# Patient Record
Sex: MALE | Race: WHITE | Employment: FULL TIME | URBAN - METROPOLITAN AREA
[De-identification: names, ages, dates, MRNs, and addresses within clinical notes are randomized per-mention and may not be internally consistent; named-entity substitution may affect disease eponyms.]

---

## 2019-05-21 ENCOUNTER — OFFICE VISIT (OUTPATIENT)
Dept: ORTHOPEDIC SURGERY | Age: 74
End: 2019-05-21
Payer: MEDICARE

## 2019-05-21 VITALS — WEIGHT: 165 LBS | RESPIRATION RATE: 16 BRPM | BODY MASS INDEX: 23.1 KG/M2 | HEIGHT: 71 IN

## 2019-05-21 DIAGNOSIS — M25.521 RIGHT ELBOW PAIN: ICD-10-CM

## 2019-05-21 DIAGNOSIS — M70.21 OLECRANON BURSITIS OF RIGHT ELBOW: ICD-10-CM

## 2019-05-21 PROCEDURE — 4040F PNEUMOC VAC/ADMIN/RCVD: CPT | Performed by: ORTHOPAEDIC SURGERY

## 2019-05-21 PROCEDURE — G8427 DOCREV CUR MEDS BY ELIG CLIN: HCPCS | Performed by: ORTHOPAEDIC SURGERY

## 2019-05-21 PROCEDURE — 4004F PT TOBACCO SCREEN RCVD TLK: CPT | Performed by: ORTHOPAEDIC SURGERY

## 2019-05-21 PROCEDURE — E0191 PROTECTOR HEEL OR ELBOW: HCPCS | Performed by: ORTHOPAEDIC SURGERY

## 2019-05-21 PROCEDURE — G8420 CALC BMI NORM PARAMETERS: HCPCS | Performed by: ORTHOPAEDIC SURGERY

## 2019-05-21 PROCEDURE — 99203 OFFICE O/P NEW LOW 30 MIN: CPT | Performed by: ORTHOPAEDIC SURGERY

## 2019-05-21 PROCEDURE — 1123F ACP DISCUSS/DSCN MKR DOCD: CPT | Performed by: ORTHOPAEDIC SURGERY

## 2019-05-21 RX ORDER — IBUPROFEN 600 MG/1
600 TABLET ORAL
Status: ON HOLD | COMMUNITY
End: 2019-07-16 | Stop reason: HOSPADM

## 2019-05-21 RX ORDER — CEPHALEXIN 500 MG/1
500 CAPSULE ORAL 4 TIMES DAILY
Qty: 28 CAPSULE | Refills: 0 | Status: SHIPPED | OUTPATIENT
Start: 2019-05-21 | End: 2019-05-28

## 2019-05-21 RX ORDER — ASPIRIN 81 MG/1
81 TABLET, CHEWABLE ORAL
Status: ON HOLD | COMMUNITY
End: 2019-07-16 | Stop reason: HOSPADM

## 2019-05-21 RX ORDER — AMLODIPINE BESYLATE 10 MG/1
TABLET ORAL
Refills: 2 | Status: ON HOLD | COMMUNITY
Start: 2019-05-03 | End: 2019-07-16 | Stop reason: HOSPADM

## 2019-05-21 RX ORDER — ATORVASTATIN CALCIUM 40 MG/1
20 TABLET, FILM COATED ORAL DAILY
COMMUNITY

## 2019-05-21 RX ORDER — LOSARTAN POTASSIUM 50 MG/1
TABLET ORAL
Refills: 1 | Status: ON HOLD | COMMUNITY
Start: 2019-05-04 | End: 2019-07-16 | Stop reason: HOSPADM

## 2019-05-21 NOTE — PROGRESS NOTES
Chief Complaint  Elbow Pain (NEW PATIENT RT ELBOW PAIN)      History of Present Illness:  Dipesh Reynoso is a 66 y.o. male. He presents today for a new hand surgery and elbow specialty evaluation regarding his right elbow. He reports a few weeks ago he started to notice an abrasion over the posterior elbow where he was struck by a piece of firewood when traveling previously in [place]. In this area he started to notice some slight discomfort and fullness and some slight discoloration. He did not have any drainage or fevers or chills and has not had dramatic pain. He has not had any loss of range of motion to the elbow. Medical History  Past Medical History:   Diagnosis Date    Hyperlipidemia     Hypertension      History reviewed. No pertinent surgical history.   Social History     Socioeconomic History    Marital status: Single     Spouse name: None    Number of children: None    Years of education: None    Highest education level: None   Occupational History    None   Social Needs    Financial resource strain: None    Food insecurity:     Worry: None     Inability: None    Transportation needs:     Medical: None     Non-medical: None   Tobacco Use    Smoking status: Current Every Day Smoker     Types: Pipe   Substance and Sexual Activity    Alcohol use: Yes     Comment: social    Drug use: No    Sexual activity: None   Lifestyle    Physical activity:     Days per week: None     Minutes per session: None    Stress: None   Relationships    Social connections:     Talks on phone: None     Gets together: None     Attends Gnosticism service: None     Active member of club or organization: None     Attends meetings of clubs or organizations: None     Relationship status: None    Intimate partner violence:     Fear of current or ex partner: None     Emotionally abused: None     Physically abused: None     Forced sexual activity: None   Other Topics Concern    None   Social History Narrative  None     Current Outpatient Medications   Medication Sig Dispense Refill    atorvastatin (LIPITOR) 40 MG tablet Take 40 mg by mouth daily      cephALEXin (KEFLEX) 500 MG capsule Take 1 capsule by mouth 4 times daily for 7 days 28 capsule 0    amLODIPine (NORVASC) 10 MG tablet TAKE 1 TABLET BY MOUTH DAILY. TAPER OFF METOPROLOL  2    ibuprofen (ADVIL;MOTRIN) 600 MG tablet Take 600 mg by mouth      aspirin 81 MG chewable tablet Take 81 mg by mouth      losartan (COZAAR) 50 MG tablet TAKE 1 TABLET BY MOUTH EVERY DAY  1     No current facility-administered medications for this visit. No Known Allergies    Review of Systems  Pertinent items are noted in HPI  Denies fever, chills, confusion, bowel/bladder active change. Review of systems reviewed from Patient History Form dated on 5/21/19 and available in the patient's chart under the Media tab. Vital Signs  Vitals:    05/21/19 1436   Resp: 16       General Exam:   Constitutional: Patient is adequately groomed with no evidence of malnutrition  Mental Status: The patient is oriented to time, place and person. The patient's mood and affect are appropriate. Lymphatic: The lymphatic examination bilaterally reveals all areas to be without enlargement or induration. Neurological: The patient has good coordination. There is no weakness or sensory deficit. Elbow Examination  Inspection:  The patient has multiple areas of skin discoloration with thin skin and appears to bruising easily. There is no area of fullness over the posterior right elbow in the region of the olecranon bursa but no active drainage. There is a subacute abrasion which appears to be crusting and healing over the posterior elbow. Palpation:  There is no warmth over the posterior elbow and there is no palpable defect over the triceps. There is a palpable superficial abrasion with some crusting but no sign of active infection.   There is soft tissue fullness consistent with olecranon bursitis    Range of Motion:  Full range of motion    Strength:  Normal including triceps    Special Tests:  Elbow stability remains intact    Skin: There are no additional worrisome rashes, ulcerations or lesions. Gait: normal    Circulation normal    Additional Comments:     Additional Examinations:  Left Upper Extremity: Examination of the left upper extremity does not show any tenderness, deformity or injury. Range of motion is unremarkable. There is no gross instability. There are no rashes, ulcerations or lesions. Strength and tone are normal.      Radiology:     X-rays obtained and reviewed in office:  Views 3  Location right elbow  Impression Three views of the right elbow reveal no sign of fracture, no loose body, and satisfactory articular congruity. Assessment:  Right elbow olecranon bursitis    Impression:  Encounter Diagnoses   Name Primary?  Right elbow pain     Olecranon bursitis of right elbow        Office Procedures:  Orders Placed This Encounter   Procedures    XR ELBOW RIGHT (MIN 3 VIEWS)     Standing Status:   Future     Number of Occurrences:   1     Standing Expiration Date:   5/21/2020   Meli Wilson and Emmanuelle Heel and Elbow Protector     Patient was prescribed a Bird and Emmanuelle Elbow Protector. The right elbow will require protection from this device to improve their function. The orthosis will assist in protecting the affected area, provide functional support and facilitate healing. The patient was educated and fit by a healthcare professional with expert knowledge and specialization in brace application while under the direct supervision of the treating physician. Verbal and written instructions for the use of and application of this item were provided. They were instructed to contact the office immediately should the brace result in increased pain, decreased sensation, increased swelling or worsening of the condition.        Treatment Plan:  I discussed with the patient that very likely his bursitis has emanated from a slight abrasion but that there is currently no convincing sign of worrisome active infection. Nevertheless, I would like to supply him both prescription for an antibiotic as well as a padded elbow sleeve to minimize the chance of any progression into an infectious process that would become more concerning. We talked about skin care, moisturizing the area of the dry skin and abrasion, and use of the elbow pad and prevention of unnecessary pressure over the elbow. I strongly suspect that this will resolve nicely with conservative care and time, but he does understand that maximum improvement in the fullness may take many weeks and even several months. I will be happy to see him back if he starts to have worsening symptoms or any concerns of active infection. Please note that this transcription was created using voice recognition software. Any errors are unintentional and may be due to voice recognition transcription.

## 2019-07-09 ENCOUNTER — HOSPITAL ENCOUNTER (INPATIENT)
Age: 74
LOS: 6 days | Discharge: ANOTHER HOSPITAL | DRG: 286 | End: 2019-07-16
Attending: EMERGENCY MEDICINE | Admitting: INTERNAL MEDICINE
Payer: MEDICARE

## 2019-07-09 ENCOUNTER — APPOINTMENT (OUTPATIENT)
Dept: GENERAL RADIOLOGY | Age: 74
DRG: 286 | End: 2019-07-09
Payer: MEDICARE

## 2019-07-09 ENCOUNTER — APPOINTMENT (OUTPATIENT)
Dept: CT IMAGING | Age: 74
DRG: 286 | End: 2019-07-09
Payer: MEDICARE

## 2019-07-09 DIAGNOSIS — J96.01 ACUTE RESPIRATORY FAILURE WITH HYPOXIA (HCC): ICD-10-CM

## 2019-07-09 DIAGNOSIS — I46.9 CARDIAC ARREST (HCC): Primary | ICD-10-CM

## 2019-07-09 DIAGNOSIS — R77.8 ELEVATED TROPONIN: ICD-10-CM

## 2019-07-09 DIAGNOSIS — G93.1 ANOXIC BRAIN DAMAGE (HCC): ICD-10-CM

## 2019-07-09 DIAGNOSIS — E87.20 LACTIC ACIDOSIS: ICD-10-CM

## 2019-07-09 DIAGNOSIS — I47.20 VENTRICULAR TACHYCARDIA: ICD-10-CM

## 2019-07-09 LAB
A/G RATIO: 1.6 (ref 1.1–2.2)
ABO/RH: NORMAL
ALBUMIN SERPL-MCNC: 4.4 G/DL (ref 3.4–5)
ALP BLD-CCNC: 92 U/L (ref 40–129)
ALT SERPL-CCNC: 80 U/L (ref 10–40)
ANION GAP SERPL CALCULATED.3IONS-SCNC: 19 MMOL/L (ref 3–16)
ANTIBODY SCREEN: NORMAL
AST SERPL-CCNC: 95 U/L (ref 15–37)
BASOPHILS ABSOLUTE: 0.1 K/UL (ref 0–0.2)
BASOPHILS RELATIVE PERCENT: 0.6 %
BILIRUB SERPL-MCNC: 0.9 MG/DL (ref 0–1)
BUN BLDV-MCNC: 30 MG/DL (ref 7–20)
CALCIUM SERPL-MCNC: 10 MG/DL (ref 8.3–10.6)
CHLORIDE BLD-SCNC: 101 MMOL/L (ref 99–110)
CO2: 22 MMOL/L (ref 21–32)
CREAT SERPL-MCNC: 0.8 MG/DL (ref 0.8–1.3)
EOSINOPHILS ABSOLUTE: 0.2 K/UL (ref 0–0.6)
EOSINOPHILS RELATIVE PERCENT: 1.4 %
GFR AFRICAN AMERICAN: >60
GFR NON-AFRICAN AMERICAN: >60
GLOBULIN: 2.8 G/DL
GLUCOSE BLD-MCNC: 147 MG/DL (ref 70–99)
GLUCOSE BLD-MCNC: 172 MG/DL (ref 70–99)
HCT VFR BLD CALC: 48.2 % (ref 40.5–52.5)
HEMOGLOBIN: 16.4 G/DL (ref 13.5–17.5)
INR BLD: 1.04 (ref 0.86–1.14)
LACTIC ACID: 6.2 MMOL/L (ref 0.4–2)
LYMPHOCYTES ABSOLUTE: 4.9 K/UL (ref 1–5.1)
LYMPHOCYTES RELATIVE PERCENT: 44.8 %
MAGNESIUM: 2 MG/DL (ref 1.8–2.4)
MCH RBC QN AUTO: 31.4 PG (ref 26–34)
MCHC RBC AUTO-ENTMCNC: 34.1 G/DL (ref 31–36)
MCV RBC AUTO: 92.2 FL (ref 80–100)
MONOCYTES ABSOLUTE: 0.6 K/UL (ref 0–1.3)
MONOCYTES RELATIVE PERCENT: 5.8 %
NEUTROPHILS ABSOLUTE: 5.2 K/UL (ref 1.7–7.7)
NEUTROPHILS RELATIVE PERCENT: 47.4 %
PDW BLD-RTO: 14.6 % (ref 12.4–15.4)
PERFORMED ON: ABNORMAL
PLATELET # BLD: 206 K/UL (ref 135–450)
PMV BLD AUTO: 8.5 FL (ref 5–10.5)
POTASSIUM SERPL-SCNC: 3.5 MMOL/L (ref 3.5–5.1)
PROTHROMBIN TIME: 11.8 SEC (ref 9.8–13)
RBC # BLD: 5.23 M/UL (ref 4.2–5.9)
SODIUM BLD-SCNC: 142 MMOL/L (ref 136–145)
TOTAL CK: 140 U/L (ref 39–308)
TOTAL PROTEIN: 7.2 G/DL (ref 6.4–8.2)
TROPONIN: 0.03 NG/ML
WBC # BLD: 11 K/UL (ref 4–11)

## 2019-07-09 PROCEDURE — 2500000003 HC RX 250 WO HCPCS: Performed by: EMERGENCY MEDICINE

## 2019-07-09 PROCEDURE — 83605 ASSAY OF LACTIC ACID: CPT

## 2019-07-09 PROCEDURE — 2580000003 HC RX 258: Performed by: EMERGENCY MEDICINE

## 2019-07-09 PROCEDURE — 2580000003 HC RX 258

## 2019-07-09 PROCEDURE — 70450 CT HEAD/BRAIN W/O DYE: CPT

## 2019-07-09 PROCEDURE — 99223 1ST HOSP IP/OBS HIGH 75: CPT | Performed by: INTERNAL MEDICINE

## 2019-07-09 PROCEDURE — 5A1945Z RESPIRATORY VENTILATION, 24-96 CONSECUTIVE HOURS: ICD-10-PCS | Performed by: EMERGENCY MEDICINE

## 2019-07-09 PROCEDURE — 71045 X-RAY EXAM CHEST 1 VIEW: CPT

## 2019-07-09 PROCEDURE — 96375 TX/PRO/DX INJ NEW DRUG ADDON: CPT

## 2019-07-09 PROCEDURE — 93005 ELECTROCARDIOGRAM TRACING: CPT | Performed by: EMERGENCY MEDICINE

## 2019-07-09 PROCEDURE — 85610 PROTHROMBIN TIME: CPT

## 2019-07-09 PROCEDURE — 82550 ASSAY OF CK (CPK): CPT

## 2019-07-09 PROCEDURE — 4500000026 HC ED CRITICAL CARE PROCEDURE

## 2019-07-09 PROCEDURE — 0BH17EZ INSERTION OF ENDOTRACHEAL AIRWAY INTO TRACHEA, VIA NATURAL OR ARTIFICIAL OPENING: ICD-10-PCS | Performed by: EMERGENCY MEDICINE

## 2019-07-09 PROCEDURE — 96376 TX/PRO/DX INJ SAME DRUG ADON: CPT

## 2019-07-09 PROCEDURE — 96374 THER/PROPH/DIAG INJ IV PUSH: CPT

## 2019-07-09 PROCEDURE — 86900 BLOOD TYPING SEROLOGIC ABO: CPT

## 2019-07-09 PROCEDURE — 51702 INSERT TEMP BLADDER CATH: CPT

## 2019-07-09 PROCEDURE — 86850 RBC ANTIBODY SCREEN: CPT

## 2019-07-09 PROCEDURE — 02HV33Z INSERTION OF INFUSION DEVICE INTO SUPERIOR VENA CAVA, PERCUTANEOUS APPROACH: ICD-10-PCS | Performed by: EMERGENCY MEDICINE

## 2019-07-09 PROCEDURE — 83735 ASSAY OF MAGNESIUM: CPT

## 2019-07-09 PROCEDURE — 86901 BLOOD TYPING SEROLOGIC RH(D): CPT

## 2019-07-09 PROCEDURE — 80053 COMPREHEN METABOLIC PANEL: CPT

## 2019-07-09 PROCEDURE — 6360000002 HC RX W HCPCS: Performed by: EMERGENCY MEDICINE

## 2019-07-09 PROCEDURE — 74018 RADEX ABDOMEN 1 VIEW: CPT

## 2019-07-09 PROCEDURE — 84484 ASSAY OF TROPONIN QUANT: CPT

## 2019-07-09 PROCEDURE — 85025 COMPLETE CBC W/AUTO DIFF WBC: CPT

## 2019-07-09 PROCEDURE — 99291 CRITICAL CARE FIRST HOUR: CPT

## 2019-07-09 RX ORDER — HEPARIN SODIUM 1000 [USP'U]/ML
4000 INJECTION, SOLUTION INTRAVENOUS; SUBCUTANEOUS PRN
Status: DISCONTINUED | OUTPATIENT
Start: 2019-07-09 | End: 2019-07-15

## 2019-07-09 RX ORDER — ETOMIDATE 2 MG/ML
INJECTION INTRAVENOUS DAILY PRN
Status: COMPLETED | OUTPATIENT
Start: 2019-07-09 | End: 2019-07-09

## 2019-07-09 RX ORDER — HEPARIN SODIUM 10000 [USP'U]/100ML
8.8 INJECTION, SOLUTION INTRAVENOUS CONTINUOUS
Status: DISCONTINUED | OUTPATIENT
Start: 2019-07-09 | End: 2019-07-10

## 2019-07-09 RX ORDER — AMIODARONE HYDROCHLORIDE 50 MG/ML
300 INJECTION, SOLUTION INTRAVENOUS ONCE
Status: COMPLETED | OUTPATIENT
Start: 2019-07-09 | End: 2019-07-09

## 2019-07-09 RX ORDER — SODIUM CHLORIDE 9 MG/ML
INJECTION, SOLUTION INTRAVENOUS
Status: COMPLETED
Start: 2019-07-09 | End: 2019-07-09

## 2019-07-09 RX ORDER — SODIUM CHLORIDE 9 MG/ML
INJECTION, SOLUTION INTRAVENOUS CONTINUOUS
Status: DISCONTINUED | OUTPATIENT
Start: 2019-07-09 | End: 2019-07-10

## 2019-07-09 RX ORDER — ROCURONIUM BROMIDE 10 MG/ML
INJECTION, SOLUTION INTRAVENOUS DAILY PRN
Status: COMPLETED | OUTPATIENT
Start: 2019-07-09 | End: 2019-07-09

## 2019-07-09 RX ORDER — MIDAZOLAM HYDROCHLORIDE 1 MG/ML
5 INJECTION INTRAMUSCULAR; INTRAVENOUS ONCE
Status: COMPLETED | OUTPATIENT
Start: 2019-07-09 | End: 2019-07-09

## 2019-07-09 RX ORDER — HEPARIN SODIUM 1000 [USP'U]/ML
2000 INJECTION, SOLUTION INTRAVENOUS; SUBCUTANEOUS PRN
Status: DISCONTINUED | OUTPATIENT
Start: 2019-07-09 | End: 2019-07-15

## 2019-07-09 RX ORDER — HEPARIN SODIUM 1000 [USP'U]/ML
4000 INJECTION, SOLUTION INTRAVENOUS; SUBCUTANEOUS ONCE
Status: COMPLETED | OUTPATIENT
Start: 2019-07-09 | End: 2019-07-09

## 2019-07-09 RX ADMIN — ROCURONIUM BROMIDE 7 MG: 10 SOLUTION INTRAVENOUS at 21:32

## 2019-07-09 RX ADMIN — MIDAZOLAM 1 MG/HR: 5 INJECTION INTRAMUSCULAR; INTRAVENOUS at 22:50

## 2019-07-09 RX ADMIN — HEPARIN SODIUM 4000 UNITS: 1000 INJECTION, SOLUTION INTRAVENOUS; SUBCUTANEOUS at 23:30

## 2019-07-09 RX ADMIN — AMIODARONE HYDROCHLORIDE 150 MG: 50 INJECTION, SOLUTION INTRAVENOUS at 22:27

## 2019-07-09 RX ADMIN — SODIUM CHLORIDE 2244 ML: 9 INJECTION, SOLUTION INTRAVENOUS at 22:00

## 2019-07-09 RX ADMIN — SODIUM CHLORIDE: 9 INJECTION, SOLUTION INTRAVENOUS at 21:30

## 2019-07-09 RX ADMIN — MIDAZOLAM HYDROCHLORIDE 5 MG: 2 INJECTION, SOLUTION INTRAMUSCULAR; INTRAVENOUS at 23:05

## 2019-07-09 RX ADMIN — AMIODARONE HYDROCHLORIDE 1 MG/MIN: 50 INJECTION, SOLUTION INTRAVENOUS at 22:44

## 2019-07-09 RX ADMIN — HEPARIN SODIUM AND DEXTROSE 8.8 ML/HR: 10000; 5 INJECTION INTRAVENOUS at 23:00

## 2019-07-09 RX ADMIN — MIDAZOLAM HYDROCHLORIDE 5 MG: 2 INJECTION, SOLUTION INTRAMUSCULAR; INTRAVENOUS at 21:34

## 2019-07-09 RX ADMIN — ETOMIDATE 20 MG: 2 INJECTION INTRAVENOUS at 21:31

## 2019-07-09 RX ADMIN — AMIODARONE HYDROCHLORIDE 300 MG: 50 INJECTION, SOLUTION INTRAVENOUS at 23:03

## 2019-07-09 ASSESSMENT — PULMONARY FUNCTION TESTS: PIF_VALUE: 14

## 2019-07-09 NOTE — LETTER
including skilled nursing facilities, home health agencies, inpatient rehabilitation facilities, and long term care hospitals. [facility] is working closely with the doctors and other health care providers that care for you during and following your hospital stay and for a period of time after you leave the hospital. By working together, the health care providers are trying to more efficiently provide well-managed, high quality, patient-centered care as you undergo treatment. Hospitals, doctors, and other health care providers that care for you following a hospital stay may receive an additional payment for providing better, more coordinated health care. Medicare will monitor your care to make sure you and others get high quality care. Your feedback is important     Medicare may also ask you to answer a survey about the services and care you received from [address] will be mailed to you. Your feedback will improve care for all people with Medicare who receive care from [facility]. Completion of this survey is optional.     Get more information     For more information about the Bundled Payments for [address] [place] [address], you can:    · Visit the CMS BPCI Advanced Website at http://bowles-brunson.net/ initiatives/bpci-advanced   · Call the [facility] BPCI-A team at [phone]. · Call 1-800-MEDICARE (2-214.447.2468). TTY users can call 4-832.372.3221     If you have concerns or complaints about your care, talk to your health care provider, or contact your Beneficiary and Family Centered Quality Improvement Organization [facility]). To get your [facility]'s phone number, visit Medicare.gov/contacts or call 1-800-MEDICARE. · To find a different hospital, visit www. hospitalcompare.[facility].gov or call 1-800[employer]x MEDICARE (1-487.215.4616). TTY users should call 4-214.831.8041.

## 2019-07-10 PROBLEM — R79.89 ELEVATED LFTS: Status: ACTIVE | Noted: 2019-07-10

## 2019-07-10 PROBLEM — R79.89 ELEVATED TROPONIN: Status: ACTIVE | Noted: 2019-07-10

## 2019-07-10 PROBLEM — J96.01 ACUTE RESPIRATORY FAILURE WITH HYPOXIA (HCC): Status: ACTIVE | Noted: 2019-07-10

## 2019-07-10 PROBLEM — E87.20 LACTIC ACID ACIDOSIS: Status: ACTIVE | Noted: 2019-07-10

## 2019-07-10 PROBLEM — I35.0 SEVERE AORTIC STENOSIS: Status: ACTIVE | Noted: 2019-07-10

## 2019-07-10 PROBLEM — I25.5 ISCHEMIC CARDIOMYOPATHY: Status: ACTIVE | Noted: 2019-07-10

## 2019-07-10 PROBLEM — R77.8 ELEVATED TROPONIN: Status: ACTIVE | Noted: 2019-07-10

## 2019-07-10 PROBLEM — I46.9 CARDIOPULMONARY ARREST WITH SUCCESSFUL RESUSCITATION (HCC): Status: ACTIVE | Noted: 2019-07-10

## 2019-07-10 PROBLEM — D72.829 LEUKOCYTOSIS: Status: ACTIVE | Noted: 2019-07-10

## 2019-07-10 LAB
A/G RATIO: 1.7 (ref 1.1–2.2)
ALBUMIN SERPL-MCNC: 3.9 G/DL (ref 3.4–5)
ALP BLD-CCNC: 79 U/L (ref 40–129)
ALT SERPL-CCNC: 89 U/L (ref 10–40)
AMORPHOUS: ABNORMAL /HPF
ANION GAP SERPL CALCULATED.3IONS-SCNC: 11 MMOL/L (ref 3–16)
ANION GAP SERPL CALCULATED.3IONS-SCNC: 12 MMOL/L (ref 3–16)
ANION GAP SERPL CALCULATED.3IONS-SCNC: 13 MMOL/L (ref 3–16)
APTT: 132 SEC (ref 26–36)
APTT: 31.6 SEC (ref 26–36)
APTT: 72.5 SEC (ref 26–36)
AST SERPL-CCNC: 151 U/L (ref 15–37)
BACTERIA: ABNORMAL /HPF
BASE EXCESS ARTERIAL: -4 (ref -3–3)
BASE EXCESS ARTERIAL: -4.2 MMOL/L (ref -3–3)
BASE EXCESS ARTERIAL: -5.3 MMOL/L (ref -3–3)
BASE EXCESS ARTERIAL: -6 (ref -3–3)
BILIRUB SERPL-MCNC: 0.9 MG/DL (ref 0–1)
BILIRUBIN URINE: NEGATIVE
BLOOD, URINE: ABNORMAL
BUN BLDV-MCNC: 30 MG/DL (ref 7–20)
BUN BLDV-MCNC: 31 MG/DL (ref 7–20)
BUN BLDV-MCNC: 32 MG/DL (ref 7–20)
CALCIUM IONIZED: 1.09 MMOL/L (ref 1.12–1.32)
CALCIUM IONIZED: 1.09 MMOL/L (ref 1.12–1.32)
CALCIUM IONIZED: 1.1 MMOL/L (ref 1.12–1.32)
CALCIUM SERPL-MCNC: 8.2 MG/DL (ref 8.3–10.6)
CALCIUM SERPL-MCNC: 8.2 MG/DL (ref 8.3–10.6)
CALCIUM SERPL-MCNC: 8.3 MG/DL (ref 8.3–10.6)
CARBOXYHEMOGLOBIN ARTERIAL: 0.3 % (ref 0–1.5)
CARBOXYHEMOGLOBIN ARTERIAL: 0.5 % (ref 0–1.5)
CHLORIDE BLD-SCNC: 103 MMOL/L (ref 99–110)
CHLORIDE BLD-SCNC: 108 MMOL/L (ref 99–110)
CHLORIDE BLD-SCNC: 108 MMOL/L (ref 99–110)
CHOLESTEROL, TOTAL: 150 MG/DL (ref 0–199)
CLARITY: ABNORMAL
CO2: 19 MMOL/L (ref 21–32)
CO2: 20 MMOL/L (ref 21–32)
CO2: 24 MMOL/L (ref 21–32)
COLOR: YELLOW
CREAT SERPL-MCNC: 0.7 MG/DL (ref 0.8–1.3)
GFR AFRICAN AMERICAN: >60
GFR NON-AFRICAN AMERICAN: >60
GLOBULIN: 2.3 G/DL
GLUCOSE BLD-MCNC: 130 MG/DL (ref 70–99)
GLUCOSE BLD-MCNC: 137 MG/DL (ref 70–99)
GLUCOSE BLD-MCNC: 164 MG/DL (ref 70–99)
GLUCOSE BLD-MCNC: 165 MG/DL (ref 70–99)
GLUCOSE BLD-MCNC: 210 MG/DL (ref 70–99)
GLUCOSE URINE: NEGATIVE MG/DL
HCO3 ARTERIAL: 19.8 MMOL/L (ref 21–29)
HCO3 ARTERIAL: 19.9 MMOL/L (ref 21–29)
HCO3 ARTERIAL: 21.7 MMOL/L (ref 21–29)
HCO3 ARTERIAL: 23 MMOL/L (ref 21–29)
HCT VFR BLD CALC: 43.2 % (ref 40.5–52.5)
HCT VFR BLD CALC: 43.4 % (ref 40.5–52.5)
HCT VFR BLD CALC: 43.9 % (ref 40.5–52.5)
HCT VFR BLD CALC: 44.6 % (ref 40.5–52.5)
HDLC SERPL-MCNC: 78 MG/DL (ref 40–60)
HEMOGLOBIN, ART, EXTENDED: 15.1 G/DL (ref 13.5–17.5)
HEMOGLOBIN, ART, EXTENDED: 15.3 G/DL (ref 13.5–17.5)
HEMOGLOBIN: 14.4 G/DL (ref 13.5–17.5)
HEMOGLOBIN: 14.8 G/DL (ref 13.5–17.5)
HEMOGLOBIN: 14.9 G/DL (ref 13.5–17.5)
HEMOGLOBIN: 15.2 G/DL (ref 13.5–17.5)
INR BLD: 1.18 (ref 0.86–1.14)
KETONES, URINE: NEGATIVE MG/DL
LACTIC ACID: 2.6 MMOL/L (ref 0.4–2)
LACTIC ACID: 2.8 MMOL/L (ref 0.4–2)
LACTIC ACID: 2.8 MMOL/L (ref 0.4–2)
LDL CHOLESTEROL CALCULATED: 62 MG/DL
LEUKOCYTE ESTERASE, URINE: NEGATIVE
LV EF: 28 %
LVEF MODALITY: NORMAL
MAGNESIUM: 1.6 MG/DL (ref 1.8–2.4)
MAGNESIUM: 2.1 MG/DL (ref 1.8–2.4)
MAGNESIUM: 2.2 MG/DL (ref 1.8–2.4)
MCH RBC QN AUTO: 30.6 PG (ref 26–34)
MCH RBC QN AUTO: 31.1 PG (ref 26–34)
MCH RBC QN AUTO: 31.2 PG (ref 26–34)
MCH RBC QN AUTO: 31.2 PG (ref 26–34)
MCHC RBC AUTO-ENTMCNC: 33.4 G/DL (ref 31–36)
MCHC RBC AUTO-ENTMCNC: 33.7 G/DL (ref 31–36)
MCHC RBC AUTO-ENTMCNC: 34 G/DL (ref 31–36)
MCHC RBC AUTO-ENTMCNC: 34.3 G/DL (ref 31–36)
MCV RBC AUTO: 90.7 FL (ref 80–100)
MCV RBC AUTO: 91.7 FL (ref 80–100)
MCV RBC AUTO: 91.7 FL (ref 80–100)
MCV RBC AUTO: 92.2 FL (ref 80–100)
METHEMOGLOBIN ARTERIAL: 0.5 %
METHEMOGLOBIN ARTERIAL: 0.6 %
MICROSCOPIC EXAMINATION: YES
NITRITE, URINE: NEGATIVE
O2 CONTENT ARTERIAL: 18 ML/DL
O2 CONTENT ARTERIAL: 21 ML/DL
O2 SAT, ARTERIAL: 86.6 %
O2 SAT, ARTERIAL: 87 % (ref 93–100)
O2 SAT, ARTERIAL: 90 % (ref 93–100)
O2 SAT, ARTERIAL: 97.4 %
O2 THERAPY: ABNORMAL
O2 THERAPY: ABNORMAL
PCO2 ARTERIAL: 36 MM HG (ref 35–45)
PCO2 ARTERIAL: 37.7 MMHG (ref 35–45)
PCO2 ARTERIAL: 38 MM HG (ref 35–45)
PCO2 ARTERIAL: 50.2 MMHG (ref 35–45)
PDW BLD-RTO: 14.2 % (ref 12.4–15.4)
PDW BLD-RTO: 14.4 % (ref 12.4–15.4)
PDW BLD-RTO: 14.6 % (ref 12.4–15.4)
PDW BLD-RTO: 14.7 % (ref 12.4–15.4)
PERFORMED ON: ABNORMAL
PH ARTERIAL: 7.28 (ref 7.35–7.45)
PH ARTERIAL: 7.34 (ref 7.35–7.45)
PH ARTERIAL: 7.35 (ref 7.35–7.45)
PH ARTERIAL: 7.37 (ref 7.35–7.45)
PH UA: 5 (ref 5–8)
PH VENOUS: 7.24 (ref 7.35–7.45)
PH VENOUS: 7.34 (ref 7.35–7.45)
PH VENOUS: 7.37 (ref 7.35–7.45)
PHOSPHORUS: 1.1 MG/DL (ref 2.5–4.9)
PHOSPHORUS: 2.7 MG/DL (ref 2.5–4.9)
PHOSPHORUS: 3.6 MG/DL (ref 2.5–4.9)
PLATELET # BLD: 166 K/UL (ref 135–450)
PLATELET # BLD: 166 K/UL (ref 135–450)
PLATELET # BLD: 188 K/UL (ref 135–450)
PLATELET # BLD: 201 K/UL (ref 135–450)
PMV BLD AUTO: 8.3 FL (ref 5–10.5)
PMV BLD AUTO: 8.5 FL (ref 5–10.5)
PMV BLD AUTO: 8.6 FL (ref 5–10.5)
PMV BLD AUTO: 8.7 FL (ref 5–10.5)
PO2 ARTERIAL: 111.6 MMHG (ref 75–108)
PO2 ARTERIAL: 50.4 MMHG (ref 75–108)
PO2 ARTERIAL: 54.7 MM HG (ref 75–108)
PO2 ARTERIAL: 61.8 MM HG (ref 75–108)
POC ACT LR: 199 SEC
POC ACT LR: 377 SEC
POC SAMPLE TYPE: ABNORMAL
POC SAMPLE TYPE: ABNORMAL
POTASSIUM REFLEX MAGNESIUM: 2.9 MMOL/L (ref 3.5–5.1)
POTASSIUM SERPL-SCNC: 2.9 MMOL/L (ref 3.5–5.1)
POTASSIUM SERPL-SCNC: 3.3 MMOL/L (ref 3.5–5.1)
POTASSIUM SERPL-SCNC: 3.4 MMOL/L (ref 3.5–5.1)
PROTEIN UA: NEGATIVE MG/DL
PROTHROMBIN TIME: 13.5 SEC (ref 9.8–13)
RBC # BLD: 4.71 M/UL (ref 4.2–5.9)
RBC # BLD: 4.76 M/UL (ref 4.2–5.9)
RBC # BLD: 4.79 M/UL (ref 4.2–5.9)
RBC # BLD: 4.86 M/UL (ref 4.2–5.9)
RBC UA: >100 /HPF (ref 0–2)
SODIUM BLD-SCNC: 138 MMOL/L (ref 136–145)
SODIUM BLD-SCNC: 140 MMOL/L (ref 136–145)
SODIUM BLD-SCNC: 140 MMOL/L (ref 136–145)
SPECIFIC GRAVITY UA: 1.01 (ref 1–1.03)
TCO2 ARTERIAL: 21 MMOL/L
TCO2 ARTERIAL: 21 MMOL/L
TCO2 ARTERIAL: 23 MMOL/L
TCO2 ARTERIAL: 24.5 MMOL/L
TOTAL PROTEIN: 6.2 G/DL (ref 6.4–8.2)
TRIGL SERPL-MCNC: 50 MG/DL (ref 0–150)
TROPONIN: 2.09 NG/ML
URINE TYPE: ABNORMAL
UROBILINOGEN, URINE: 0.2 E.U./DL
VLDLC SERPL CALC-MCNC: 10 MG/DL
WBC # BLD: 13.1 K/UL (ref 4–11)
WBC # BLD: 15 K/UL (ref 4–11)
WBC # BLD: 15.9 K/UL (ref 4–11)
WBC # BLD: 18 K/UL (ref 4–11)
WBC UA: ABNORMAL /HPF (ref 0–5)

## 2019-07-10 PROCEDURE — 36556 INSERT NON-TUNNEL CV CATH: CPT

## 2019-07-10 PROCEDURE — B2111ZZ FLUOROSCOPY OF MULTIPLE CORONARY ARTERIES USING LOW OSMOLAR CONTRAST: ICD-10-PCS | Performed by: INTERNAL MEDICINE

## 2019-07-10 PROCEDURE — 81001 URINALYSIS AUTO W/SCOPE: CPT

## 2019-07-10 PROCEDURE — 2580000003 HC RX 258: Performed by: EMERGENCY MEDICINE

## 2019-07-10 PROCEDURE — 2500000003 HC RX 250 WO HCPCS: Performed by: NURSE PRACTITIONER

## 2019-07-10 PROCEDURE — 82947 ASSAY GLUCOSE BLOOD QUANT: CPT

## 2019-07-10 PROCEDURE — 83605 ASSAY OF LACTIC ACID: CPT

## 2019-07-10 PROCEDURE — 4A023N7 MEASUREMENT OF CARDIAC SAMPLING AND PRESSURE, LEFT HEART, PERCUTANEOUS APPROACH: ICD-10-PCS | Performed by: INTERNAL MEDICINE

## 2019-07-10 PROCEDURE — 2580000003 HC RX 258: Performed by: INTERNAL MEDICINE

## 2019-07-10 PROCEDURE — 6360000004 HC RX CONTRAST MEDICATION: Performed by: INTERNAL MEDICINE

## 2019-07-10 PROCEDURE — 80053 COMPREHEN METABOLIC PANEL: CPT

## 2019-07-10 PROCEDURE — 85347 COAGULATION TIME ACTIVATED: CPT

## 2019-07-10 PROCEDURE — 85610 PROTHROMBIN TIME: CPT

## 2019-07-10 PROCEDURE — 6360000002 HC RX W HCPCS

## 2019-07-10 PROCEDURE — 2500000003 HC RX 250 WO HCPCS

## 2019-07-10 PROCEDURE — 6360000004 HC RX CONTRAST MEDICATION

## 2019-07-10 PROCEDURE — C1887 CATHETER, GUIDING: HCPCS

## 2019-07-10 PROCEDURE — 33210 INSERT ELECTRD/PM CATH SNGL: CPT

## 2019-07-10 PROCEDURE — C9113 INJ PANTOPRAZOLE SODIUM, VIA: HCPCS | Performed by: INTERNAL MEDICINE

## 2019-07-10 PROCEDURE — 99291 CRITICAL CARE FIRST HOUR: CPT | Performed by: INTERNAL MEDICINE

## 2019-07-10 PROCEDURE — 84484 ASSAY OF TROPONIN QUANT: CPT

## 2019-07-10 PROCEDURE — 2100000000 HC CCU R&B

## 2019-07-10 PROCEDURE — 6360000002 HC RX W HCPCS: Performed by: INTERNAL MEDICINE

## 2019-07-10 PROCEDURE — 36620 INSERTION CATHETER ARTERY: CPT

## 2019-07-10 PROCEDURE — 6370000000 HC RX 637 (ALT 250 FOR IP): Performed by: INTERNAL MEDICINE

## 2019-07-10 PROCEDURE — 83735 ASSAY OF MAGNESIUM: CPT

## 2019-07-10 PROCEDURE — 2720000010 HC SURG SUPPLY STERILE

## 2019-07-10 PROCEDURE — 85730 THROMBOPLASTIN TIME PARTIAL: CPT

## 2019-07-10 PROCEDURE — C1894 INTRO/SHEATH, NON-LASER: HCPCS

## 2019-07-10 PROCEDURE — C8929 TTE W OR WO FOL WCON,DOPPLER: HCPCS

## 2019-07-10 PROCEDURE — 99153 MOD SED SAME PHYS/QHP EA: CPT

## 2019-07-10 PROCEDURE — 37799 UNLISTED PX VASCULAR SURGERY: CPT

## 2019-07-10 PROCEDURE — 85027 COMPLETE CBC AUTOMATED: CPT

## 2019-07-10 PROCEDURE — 2709999900 HC NON-CHARGEABLE SUPPLY

## 2019-07-10 PROCEDURE — 5A1223Z PERFORMANCE OF CARDIAC PACING, CONTINUOUS: ICD-10-PCS | Performed by: INTERNAL MEDICINE

## 2019-07-10 PROCEDURE — 99152 MOD SED SAME PHYS/QHP 5/>YRS: CPT

## 2019-07-10 PROCEDURE — 80061 LIPID PANEL: CPT

## 2019-07-10 PROCEDURE — 2580000003 HC RX 258

## 2019-07-10 PROCEDURE — 2700000000 HC OXYGEN THERAPY PER DAY

## 2019-07-10 PROCEDURE — 2500000003 HC RX 250 WO HCPCS: Performed by: INTERNAL MEDICINE

## 2019-07-10 PROCEDURE — 99152 MOD SED SAME PHYS/QHP 5/>YRS: CPT | Performed by: INTERNAL MEDICINE

## 2019-07-10 PROCEDURE — 82330 ASSAY OF CALCIUM: CPT

## 2019-07-10 PROCEDURE — 31500 INSERT EMERGENCY AIRWAY: CPT

## 2019-07-10 PROCEDURE — 82803 BLOOD GASES ANY COMBINATION: CPT

## 2019-07-10 PROCEDURE — 94761 N-INVAS EAR/PLS OXIMETRY MLT: CPT

## 2019-07-10 PROCEDURE — C1769 GUIDE WIRE: HCPCS

## 2019-07-10 PROCEDURE — 84100 ASSAY OF PHOSPHORUS: CPT

## 2019-07-10 PROCEDURE — 94750 HC PULMONARY COMPLIANCE STUDY: CPT

## 2019-07-10 PROCEDURE — 94002 VENT MGMT INPAT INIT DAY: CPT

## 2019-07-10 PROCEDURE — 2580000003 HC RX 258: Performed by: NURSE PRACTITIONER

## 2019-07-10 PROCEDURE — 93454 CORONARY ARTERY ANGIO S&I: CPT | Performed by: INTERNAL MEDICINE

## 2019-07-10 PROCEDURE — 92953 TEMPORARY EXTERNAL PACING: CPT

## 2019-07-10 PROCEDURE — 99292 CRITICAL CARE ADDL 30 MIN: CPT | Performed by: INTERNAL MEDICINE

## 2019-07-10 PROCEDURE — 93454 CORONARY ARTERY ANGIO S&I: CPT

## 2019-07-10 PROCEDURE — 94770 HC ETCO2 MONITOR DAILY: CPT

## 2019-07-10 PROCEDURE — 6360000002 HC RX W HCPCS: Performed by: EMERGENCY MEDICINE

## 2019-07-10 RX ORDER — POTASSIUM CHLORIDE 29.8 MG/ML
20 INJECTION INTRAVENOUS PRN
Status: DISCONTINUED | OUTPATIENT
Start: 2019-07-10 | End: 2019-07-16 | Stop reason: HOSPADM

## 2019-07-10 RX ORDER — SODIUM CHLORIDE 0.9 % (FLUSH) 0.9 %
10 SYRINGE (ML) INJECTION EVERY 12 HOURS SCHEDULED
Status: DISCONTINUED | OUTPATIENT
Start: 2019-07-10 | End: 2019-07-10 | Stop reason: SDUPTHER

## 2019-07-10 RX ORDER — ATORVASTATIN CALCIUM 40 MG/1
40 TABLET, FILM COATED ORAL DAILY
Status: DISCONTINUED | OUTPATIENT
Start: 2019-07-10 | End: 2019-07-16 | Stop reason: HOSPADM

## 2019-07-10 RX ORDER — SODIUM CHLORIDE 0.9 % (FLUSH) 0.9 %
10 SYRINGE (ML) INJECTION PRN
Status: DISCONTINUED | OUTPATIENT
Start: 2019-07-10 | End: 2019-07-16 | Stop reason: HOSPADM

## 2019-07-10 RX ORDER — SODIUM CHLORIDE 9 MG/ML
INJECTION, SOLUTION INTRAVENOUS CONTINUOUS
Status: DISCONTINUED | OUTPATIENT
Start: 2019-07-10 | End: 2019-07-10

## 2019-07-10 RX ORDER — SODIUM CHLORIDE 9 MG/ML
INJECTION, SOLUTION INTRAVENOUS
Status: DISPENSED
Start: 2019-07-10 | End: 2019-07-11

## 2019-07-10 RX ORDER — ASPIRIN 81 MG/1
81 TABLET, CHEWABLE ORAL DAILY
Status: DISCONTINUED | OUTPATIENT
Start: 2019-07-10 | End: 2019-07-15

## 2019-07-10 RX ORDER — MAGNESIUM SULFATE 1 G/100ML
1 INJECTION INTRAVENOUS PRN
Status: DISCONTINUED | OUTPATIENT
Start: 2019-07-10 | End: 2019-07-16 | Stop reason: HOSPADM

## 2019-07-10 RX ORDER — ACETAMINOPHEN 325 MG/1
650 TABLET ORAL EVERY 4 HOURS PRN
Status: DISCONTINUED | OUTPATIENT
Start: 2019-07-10 | End: 2019-07-16 | Stop reason: HOSPADM

## 2019-07-10 RX ORDER — SODIUM CHLORIDE 0.9 % (FLUSH) 0.9 %
10 SYRINGE (ML) INJECTION PRN
Status: DISCONTINUED | OUTPATIENT
Start: 2019-07-10 | End: 2019-07-10 | Stop reason: SDUPTHER

## 2019-07-10 RX ORDER — PANTOPRAZOLE SODIUM 40 MG/10ML
40 INJECTION, POWDER, LYOPHILIZED, FOR SOLUTION INTRAVENOUS DAILY
Status: DISCONTINUED | OUTPATIENT
Start: 2019-07-10 | End: 2019-07-16 | Stop reason: HOSPADM

## 2019-07-10 RX ORDER — CHLORHEXIDINE GLUCONATE 0.12 MG/ML
15 RINSE ORAL 2 TIMES DAILY
Status: DISCONTINUED | OUTPATIENT
Start: 2019-07-10 | End: 2019-07-16 | Stop reason: HOSPADM

## 2019-07-10 RX ORDER — DOPAMINE HYDROCHLORIDE 160 MG/100ML
5 INJECTION, SOLUTION INTRAVENOUS CONTINUOUS
Status: DISCONTINUED | OUTPATIENT
Start: 2019-07-10 | End: 2019-07-12

## 2019-07-10 RX ORDER — PROPOFOL 10 MG/ML
10 INJECTION, EMULSION INTRAVENOUS
Status: DISCONTINUED | OUTPATIENT
Start: 2019-07-10 | End: 2019-07-10

## 2019-07-10 RX ORDER — SODIUM CHLORIDE 9 MG/ML
INJECTION, SOLUTION INTRAVENOUS
Status: DISPENSED
Start: 2019-07-10 | End: 2019-07-10

## 2019-07-10 RX ORDER — HEPARIN SODIUM 10000 [USP'U]/100ML
4.4 INJECTION, SOLUTION INTRAVENOUS CONTINUOUS
Status: DISCONTINUED | OUTPATIENT
Start: 2019-07-10 | End: 2019-07-10

## 2019-07-10 RX ORDER — FUROSEMIDE 10 MG/ML
40 INJECTION INTRAMUSCULAR; INTRAVENOUS ONCE
Status: COMPLETED | OUTPATIENT
Start: 2019-07-10 | End: 2019-07-10

## 2019-07-10 RX ORDER — CARBOXYMETHYLCELLULOSE SODIUM 10 MG/ML
1 GEL OPHTHALMIC 3 TIMES DAILY
Status: DISCONTINUED | OUTPATIENT
Start: 2019-07-10 | End: 2019-07-14

## 2019-07-10 RX ORDER — DOPAMINE HYDROCHLORIDE 160 MG/100ML
5 INJECTION, SOLUTION INTRAVENOUS CONTINUOUS
Status: DISCONTINUED | OUTPATIENT
Start: 2019-07-10 | End: 2019-07-10 | Stop reason: SDUPTHER

## 2019-07-10 RX ORDER — FENTANYL CITRATE 50 UG/ML
25 INJECTION, SOLUTION INTRAMUSCULAR; INTRAVENOUS
Status: DISCONTINUED | OUTPATIENT
Start: 2019-07-10 | End: 2019-07-15

## 2019-07-10 RX ORDER — HEPARIN SODIUM 10000 [USP'U]/100ML
14.3 INJECTION, SOLUTION INTRAVENOUS CONTINUOUS
Status: DISCONTINUED | OUTPATIENT
Start: 2019-07-10 | End: 2019-07-15

## 2019-07-10 RX ORDER — ACETAMINOPHEN 650 MG/1
650 SUPPOSITORY RECTAL EVERY 4 HOURS PRN
Status: DISCONTINUED | OUTPATIENT
Start: 2019-07-10 | End: 2019-07-16 | Stop reason: HOSPADM

## 2019-07-10 RX ORDER — POTASSIUM CHLORIDE 7.45 MG/ML
10 INJECTION INTRAVENOUS PRN
Status: DISCONTINUED | OUTPATIENT
Start: 2019-07-10 | End: 2019-07-10

## 2019-07-10 RX ORDER — ONDANSETRON 2 MG/ML
4 INJECTION INTRAMUSCULAR; INTRAVENOUS EVERY 6 HOURS PRN
Status: DISCONTINUED | OUTPATIENT
Start: 2019-07-10 | End: 2019-07-10 | Stop reason: SDUPTHER

## 2019-07-10 RX ORDER — SODIUM CHLORIDE 0.9 % (FLUSH) 0.9 %
10 SYRINGE (ML) INJECTION EVERY 12 HOURS SCHEDULED
Status: DISCONTINUED | OUTPATIENT
Start: 2019-07-10 | End: 2019-07-16 | Stop reason: HOSPADM

## 2019-07-10 RX ORDER — ONDANSETRON 2 MG/ML
4 INJECTION INTRAMUSCULAR; INTRAVENOUS EVERY 6 HOURS PRN
Status: DISCONTINUED | OUTPATIENT
Start: 2019-07-10 | End: 2019-07-16 | Stop reason: HOSPADM

## 2019-07-10 RX ADMIN — SODIUM CHLORIDE: 9 INJECTION, SOLUTION INTRAVENOUS at 15:09

## 2019-07-10 RX ADMIN — FENTANYL CITRATE 25 MCG/HR: 50 INJECTION INTRAVENOUS at 02:58

## 2019-07-10 RX ADMIN — FENTANYL CITRATE 125 MCG/HR: 50 INJECTION INTRAVENOUS at 18:11

## 2019-07-10 RX ADMIN — PERFLUTREN 2.42 MG: 6.52 INJECTION, SUSPENSION INTRAVENOUS at 08:48

## 2019-07-10 RX ADMIN — SODIUM PHOSPHATE, MONOBASIC, MONOHYDRATE 24.48 MMOL: 276; 142 INJECTION, SOLUTION INTRAVENOUS at 16:41

## 2019-07-10 RX ADMIN — NOREPINEPHRINE BITARTRATE 25 MCG/MIN: 1 INJECTION INTRAVENOUS at 23:45

## 2019-07-10 RX ADMIN — POTASSIUM CHLORIDE 20 MEQ: 29.8 INJECTION, SOLUTION INTRAVENOUS at 10:31

## 2019-07-10 RX ADMIN — HEPARIN SODIUM AND DEXTROSE 7.6 ML/HR: 10000; 5 INJECTION INTRAVENOUS at 15:32

## 2019-07-10 RX ADMIN — AMIODARONE HYDROCHLORIDE 0.5 MG/MIN: 50 INJECTION, SOLUTION INTRAVENOUS at 07:13

## 2019-07-10 RX ADMIN — MUPIROCIN: 20 OINTMENT TOPICAL at 09:15

## 2019-07-10 RX ADMIN — CARBOXYMETHYLCELLULOSE SODIUM 1 DROP: 10 GEL OPHTHALMIC at 15:00

## 2019-07-10 RX ADMIN — DOPAMINE HYDROCHLORIDE 15 MCG/KG/MIN: 160 INJECTION, SOLUTION INTRAVENOUS at 06:22

## 2019-07-10 RX ADMIN — Medication 10 ML: at 08:37

## 2019-07-10 RX ADMIN — FUROSEMIDE 40 MG: 10 INJECTION, SOLUTION INTRAMUSCULAR; INTRAVENOUS at 17:02

## 2019-07-10 RX ADMIN — Medication 15 ML: at 20:06

## 2019-07-10 RX ADMIN — NOREPINEPHRINE BITARTRATE 6 MCG/MIN: 1 INJECTION INTRAVENOUS at 13:02

## 2019-07-10 RX ADMIN — MIDAZOLAM 9 MG/HR: 5 INJECTION INTRAMUSCULAR; INTRAVENOUS at 23:08

## 2019-07-10 RX ADMIN — MUPIROCIN: 20 OINTMENT TOPICAL at 20:06

## 2019-07-10 RX ADMIN — MIDAZOLAM 9 MG/HR: 5 INJECTION INTRAMUSCULAR; INTRAVENOUS at 11:09

## 2019-07-10 RX ADMIN — POTASSIUM CHLORIDE 20 MEQ: 29.8 INJECTION, SOLUTION INTRAVENOUS at 06:18

## 2019-07-10 RX ADMIN — POTASSIUM CHLORIDE 20 MEQ: 29.8 INJECTION, SOLUTION INTRAVENOUS at 09:19

## 2019-07-10 RX ADMIN — AMIODARONE HYDROCHLORIDE 0.5 MG/MIN: 50 INJECTION, SOLUTION INTRAVENOUS at 23:45

## 2019-07-10 RX ADMIN — PANTOPRAZOLE SODIUM 40 MG: 40 INJECTION, POWDER, FOR SOLUTION INTRAVENOUS at 09:15

## 2019-07-10 RX ADMIN — SODIUM CHLORIDE 2 MCG/KG/MIN: 9 INJECTION, SOLUTION INTRAVENOUS at 03:40

## 2019-07-10 RX ADMIN — AMIODARONE HYDROCHLORIDE 0.5 MG/MIN: 50 INJECTION, SOLUTION INTRAVENOUS at 08:23

## 2019-07-10 RX ADMIN — POTASSIUM CHLORIDE 20 MEQ: 29.8 INJECTION, SOLUTION INTRAVENOUS at 17:26

## 2019-07-10 RX ADMIN — FENTANYL CITRATE 125 MCG/HR: 50 INJECTION INTRAVENOUS at 09:44

## 2019-07-10 RX ADMIN — MAGNESIUM SULFATE HEPTAHYDRATE 1 G: 1 INJECTION, SOLUTION INTRAVENOUS at 04:29

## 2019-07-10 RX ADMIN — CARBOXYMETHYLCELLULOSE SODIUM 1 DROP: 10 GEL OPHTHALMIC at 09:15

## 2019-07-10 RX ADMIN — Medication 15 ML: at 05:38

## 2019-07-10 RX ADMIN — MAGNESIUM SULFATE HEPTAHYDRATE 1 G: 1 INJECTION, SOLUTION INTRAVENOUS at 05:36

## 2019-07-10 RX ADMIN — POTASSIUM CHLORIDE 20 MEQ: 29.8 INJECTION, SOLUTION INTRAVENOUS at 16:06

## 2019-07-10 RX ADMIN — CARBOXYMETHYLCELLULOSE SODIUM 1 DROP: 10 GEL OPHTHALMIC at 20:06

## 2019-07-10 ASSESSMENT — PULMONARY FUNCTION TESTS
PIF_VALUE: 20
PIF_VALUE: 21
PIF_VALUE: 20
PIF_VALUE: 21
PIF_VALUE: 17
PIF_VALUE: 18
PIF_VALUE: 19
PIF_VALUE: 15
PIF_VALUE: 18
PIF_VALUE: 20
PIF_VALUE: 21
PIF_VALUE: 21
PIF_VALUE: 20
PIF_VALUE: 20
PIF_VALUE: 21
PIF_VALUE: 18
PIF_VALUE: 21
PIF_VALUE: 20
PIF_VALUE: 19
PIF_VALUE: 19

## 2019-07-10 NOTE — PROGRESS NOTES
Orders received to pull both arteral and venious left groin sheath from Waleska Oscar. ACT drawn and resulted 142. Sheath site without hematoma or oozing. Arterial sheath removed per policy without difficulty. Integrity of sheath inspected upon removal and no abnormalities noted. Manual pressure held X 20 minutes. Dry, sterile tegaderm applied. Pressure dressing applied. Patient tolerated well. Vital signs, groin checks, and pedal pulses will be completed per protocol (every 15 minutes X 4, every 30 minutes X 2, and every hour X 2 per protocol). Sheath removed by  Vasile Luciano RN.

## 2019-07-10 NOTE — PRE SEDATION
sodium chloride infusion   Intravenous Continuous Kulwinder Dotter V,  mL/hr at 07/09/19 2130      heparin (porcine) injection 4,000 Units  4,000 Units Intravenous Once Kulwinder Dotter V, DO        heparin (porcine) injection 4,000 Units  4,000 Units Intravenous PRN Kulwinder Dotter V, DO        heparin (porcine) injection 2,000 Units  2,000 Units Intravenous PRN Zenobia Mash, DO        heparin 25,000 units in dextrose 5% 250 mL infusion  8.8 mL/hr Intravenous Continuous Kulwinder Dotter V, DO        fentaNYL (SUBLIMAZE) 1,000 mcg in sodium chloride 0.9 % 100 mL infusion  25 mcg/hr Intravenous Continuous Zenobia Mash, DO         Current Outpatient Medications   Medication Sig Dispense Refill    amLODIPine (NORVASC) 10 MG tablet TAKE 1 TABLET BY MOUTH DAILY. TAPER OFF METOPROLOL  2    ibuprofen (ADVIL;MOTRIN) 600 MG tablet Take 600 mg by mouth      aspirin 81 MG chewable tablet Take 81 mg by mouth      losartan (COZAAR) 50 MG tablet TAKE 1 TABLET BY MOUTH EVERY DAY  1    atorvastatin (LIPITOR) 40 MG tablet Take 40 mg by mouth daily         Pre-Sedation Documentation and Exam:  I have assessed the patient and agree with the H&P present on the chart. Prior History of Anesthesia Complications:   none    Modified Mallampati:  III (soft palate, base of uvula visible)    ASA Classification:  E Status - the procedure is performed on an Emergency basis    Frederick Scale: Activity:  0 - Able to move 0 extremities voluntarily on command  Respiration:  0 - Apneic  Circulation:  1 - BP+/- 20-50mmHg of normal  Consciousness:  0 - Not responsive  Oxygen Saturation (color):  1 - Needs oxygen to maintain oxygen saturation >90%    Sedation/Anesthesia Plan:  Guard the patient's safety and welfare. Minimize physical discomfort and pain. Minimize negative psychological responses to treatment by providing sedation and analgesia and maximize the potential amnesia.   Patient to meet pre-procedure discharge

## 2019-07-10 NOTE — CONSULTS
Reason for Exam: ett Acuity: Acute Type of Exam: Initial FINDINGS: Esophagogastric tube terminates in antegrade fashion over the left upper quadrant. Clear lung bases. Nonobstructive bowel gas pattern. Left nephrolithiasis. NG tube terminates in the stomach. Ct Head Wo Contrast    Result Date: 7/9/2019  EXAMINATION: CT OF THE HEAD WITHOUT CONTRAST  7/9/2019 7:05 pm TECHNIQUE: CT of the head was performed without the administration of intravenous contrast. Dose modulation, iterative reconstruction, and/or weight based adjustment of the mA/kV was utilized to reduce the radiation dose to as low as reasonably achievable. COMPARISON: None. HISTORY: ORDERING SYSTEM PROVIDED HISTORY: poss arrest TECHNOLOGIST PROVIDED HISTORY: Has a \"code stroke\" or \"stroke alert\" been called? ->No Reason for Exam: cardiac arrest Acuity: Acute Type of Exam: Initial FINDINGS: BRAIN/VENTRICLES: No acute loss of the gray-white matter differentiation is identified to suggest acute or subacute infarct. No masses or hemorrhages within the brain parenchyma are found. The ventricles are midline. There is very minimal periventricular low-attenuation, compatible with chronic small vessel ischemic disease. There is a suspected aneurysm at the basilar tip measuring 7 mm (axial image 32, sagittal image 60). Intracranial vasculature is otherwise unremarkable. ORBITS: No orbital abnormalities are identified. SINUSES: Paranasal sinuses and mastoid air cells are clear endotracheal tube and nasogastric tube are in place. SOFT TISSUES/SKULL:  No acute abnormality of the visualized skull or soft tissues. No acute intracranial abnormality detected. 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 Further evaluation of that with CT angiography is recommended.      Xr Chest Portable    Result Date: 7/9/2019  EXAMINATION: ONE XRAY VIEW OF THE CHEST 7/9/2019 10:45 pm COMPARISON: Same day HISTORY: ORDERING SYSTEM PROVIDED HISTORY: central line placement Ref Range: 70 - 99 mg/dL 137 (H)  164 (H)   POC Glucose Latest Ref Range: 70 - 99 mg/dl  130 (H)    Calcium Latest Ref Range: 8.3 - 10.6 mg/dL 8.2 (L)  8.2 (L)   Phosphorus Latest Ref Range: 2.5 - 4.9 mg/dL 1.1 (L)  2.7   WBC Latest Ref Range: 4.0 - 11.0 K/uL 15.0 (H)  13.1 (H)   RBC Latest Ref Range: 4.20 - 5.90 M/uL 4.79  4.71   Hemoglobin Quant Latest Ref Range: 13.5 - 17.5 g/dL 14.9  14.4   Hematocrit Latest Ref Range: 40.5 - 52.5 % 43.4  43.2   MCV Latest Ref Range: 80.0 - 100.0 fL 90.7  91.7   MCH Latest Ref Range: 26.0 - 34.0 pg 31.2  30.6   MCHC Latest Ref Range: 31.0 - 36.0 g/dL 34.3  33.4   MPV Latest Ref Range: 5.0 - 10.5 fL 8.7  8.5   RDW Latest Ref Range: 12.4 - 15.4 % 14.2  14.4   Platelet Count Latest Ref Range: 135 - 450 K/uL 166  188     Results for Dominga Alexander (MRN [id number]) as of 7/10/2019 19:24   Ref.  Range 7/10/2019 10:33 7/10/2019 11:27 7/10/2019 11:30 7/10/2019 17:20   Hemoglobin, Art, Extended Latest Ref Range: 13.5 - 17.5 g/dL    15.1   pH, Arterial Latest Ref Range: 7.350 - 7.450  7.348 (L)  7.366 7.340 (L)   pCO2, Arterial Latest Ref Range: 35.0 - 45.0 mmHg 36.0  38.0 37.7   pO2, Arterial Latest Ref Range: 75.0 - 108.0 mmHg 61.8 (L)  54.7 (L) 50.4 (L)   HCO3, Arterial Latest Ref Range: 21.0 - 29.0 mmol/L 19.8 (L)  21.7 19.9 (L)   TCO2 (calc), Art Latest Ref Range: Not Established mmol/L 21  23 21.0   Base Excess, Arterial Latest Ref Range: -3.0 - 3.0 mmol/L -6 (L)  -4 (L) -5.3 (L)   O2 Sat, Arterial Latest Ref Range: >92 % 90 (L)  87 (L) 86.6 (L)   O2 Content, Arterial Latest Ref Range: Not Established mL/dL    18   Methemoglobin, Arterial Latest Ref Range: <1.5 %    0.5   Carboxyhgb, Arterial Latest Ref Range: 0.0 - 1.5 %    0.5     CT OF THE HEAD WITHOUT CONTRAST  7/9/2019 7:05 pm       TECHNIQUE:   CT of the head was performed without the administration of intravenous   contrast. Dose modulation, iterative reconstruction, and/or weight based   adjustment of the mA/kV was utilized to reduce the radiation dose to as low   as reasonably achievable.       COMPARISON:   None.       HISTORY:   ORDERING SYSTEM PROVIDED HISTORY: poss arrest   TECHNOLOGIST PROVIDED HISTORY:   Has a \"code stroke\" or \"stroke alert\" been called? ->No   Reason for Exam: cardiac arrest   Acuity: Acute   Type of Exam: Initial       FINDINGS:   BRAIN/VENTRICLES: No acute loss of the gray-white matter differentiation is   identified to suggest acute or subacute infarct.  No masses or hemorrhages   within the brain parenchyma are found.  The ventricles are midline.  There is   very minimal periventricular low-attenuation, compatible with chronic small   vessel ischemic disease.       There is a suspected aneurysm at the basilar tip measuring 7 mm (axial image   32, sagittal image 60).  Intracranial vasculature is otherwise unremarkable.       ORBITS: No orbital abnormalities are identified.       SINUSES: Paranasal sinuses and mastoid air cells are clear endotracheal tube   and nasogastric tube are in place.       SOFT TISSUES/SKULL:  No acute abnormality of the visualized skull or soft   tissues.           Impression   No acute intracranial abnormality detected.       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  Further evaluation of that with CT   angiography is recommended. Echocardiogram:    Summary   Technically difficult examination due to patient on ventilator.  Definity®   used for myocardial border enhancement.   parasternal views could only be obtained from the right sternal border.   Left ventricular systolic function is severely reduced with a visually   estimated ejection fraction of 25-30%.   EF calculated by Escoto's method at 19%.   Abnormal (paradoxical) septal motion consistent with RV pacemaker.   The right ventricle is not well visualized but appears grossly normal in   size with reduced function.   The left atrium appears mildly enlarged.   Mild aortic and tricuspid regurgitation.   Severe AS   Mild tricuspid prophylaxis    Case d/w family and ICU team    Long term prognosis appears to be guarded    Critical Care time spent -80 minutes(exclusive of any procedures)        Electronically signed by:  Elizabeth Lam MD    7/10/2019    7:29 PM.

## 2019-07-10 NOTE — OP NOTE
Mr. 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 Given stability, opted not to incur risk of IABP or LV mechanical support. Will monitor him on hypothermia protocol in cardiac ICU and reevaluate revascularization options. Reviewed above at length with his significant other and her daughters. Elizabeth Carroll D.O., [facility]  Interventional Cardiology     o: [phone]  [address], [address] [facility]        Critical care time spent in direct management of this patient was 95 minutes, exclusive of separately documented procedures. Time spent includes but was not limited to directly managing the unstable patient, reviewing diagnostics, speaking with medical staff, and developing a treatment plan.

## 2019-07-10 NOTE — CONSULTS
GASTROINTESTINAL:  soft, non-distended    Labs:  Lab Review   Lab Results   Component Value Date     07/09/2019    K 3.5 07/09/2019     07/09/2019    CO2 22 07/09/2019    BUN 30 07/09/2019    CREATININE 0.8 07/09/2019    GLUCOSE 172 07/09/2019    CALCIUM 10.0 07/09/2019     Lab Results   Component Value Date    CKTOTAL 140 07/09/2019    TROPONINI 0.03 07/09/2019     Lab Results   Component Value Date    WBC 11.0 07/09/2019    HGB 16.4 07/09/2019    HCT 48.2 07/09/2019    MCV 92.2 07/09/2019     07/09/2019       Imaging:  I have reviewed the below testing personally:    EKG:    SR RBBB    SR LBBB PVCs    AF LBBB      Impression/Recommendations    Mr. Bacilio Juan is a 66 y.o. male patient    OOH Pulseless VT Arrest with ROSC  Moderate AS by 2017 TTE  Hypertension  Hyperlipidemia    Line placement, CT head negative, Hypothermia protocol, Heparin and Amio gtts in place prior to early invasive     Risks, benefits, goals, and alternative of cardiac catheterization discussed with patient. All questions answered and informed consent obtained. Further recommendations pending coronary angiography and clinical course. Thank you for allowing me to participate in the care of your patient. Please do not hesitate to call. Carmina Feliciano D.O., [facility]  Interventional Cardiology     o: [phone]  [address] [place] [address]., [address], [address] [facility]        NOTE:  This report was transcribed using voice recognition software. Every effort was made to ensure accuracy; however, inadvertent computerized transcription errors may be present.

## 2019-07-10 NOTE — CONSULTS
Pharmacy to Manage Heparin Infusion per [facility]    Dx: cardiac arrest, CAD  Pt wt = 76.5 kg   Baseline aPTT = 31.6 seconds at 1127    Low Dose Heparin Infusion  Targeted Temperature Management influences Heparin dosing. Heparin infusion at 10 units/kg/hr. Recheck aPTT in 6 hours. Goal aPTT = 54-90 seconds. Kylee Herrera PharmD, [facility]   7/10/2019 3:07 PM      2300 7/10/19   Heparin Infusion Protocol:  APTT at 2230 is 72.5 seconds. Continue infusion at 7.6mL/hr and recheck the aPTT at 0500 per protocol. Joana BrowerD 7/10/2019 11:02 PM    7/11 0510  aptt = 95.6 sec  Hold drip x1 hour. Restart at 5.3 ml/hr. Next aptt 1200  Leonarda Corrales.7/11/2019 5:45 AM    aPTT 60.6 seconds at 1250. No change to Heparin, continue at current infusion rate of 5.3 mL/hr. Recheck aPTT in 6 hours. Rewarming phase of TTM. Kylee Herrera PharmD, [facility]   7/11/2019 2:55 PM    7/11/19  1940  Low Dose Heparin Infusion Protocol:  APTT ar 1725 is 46.8 seconds. Will order a 2,000 unit IV Bolus dose of heparin and increase the infusion to 6.8mL/hr. Recheck the aPTT at 0200 7/12/19  Joana Harrison PharmD 7/11/2019 7:37 PM     7/12 0240  aptt = 64.3 sec  Continue current rate. Next aptt 0900  Leonarda Corrales7/12/2019 3:50 AM    7/13 0625  aPTT = 37.0 sec  Give 2000 unit heparin bolus and increase drip rate to 8.3 mL/hr  Next aPTT 1330  Sammy Pedraza PharmD 7/13/2019 7:24 AM    7/13 1437  aPTT = 40.5 sec  Give 2000 units bolus and increase drip rate to 9.8 mL/hr  Next aPTT at 2100  Star Anton 7/13/2019 3:11 PM    7/14 0440  aptt = 47 sec. Give 2000 bolus and rate = 12.8 ml/hr  Next aptt 1000  Leonarda Corrales7/14/2019 5:17 AM    7/14 0931  aPTT = 59.3 sec  Continue heparin drip at 12.8 mL/hr  Next aPTT 1530  Leonarda MorseD 7/14/2019 11:02 AM    aPTT 49.7 seconds at 0517. Heparin 2000 units IVP then Heparin infusion rate of 14.3 mL/hr. Recheck aPTT in 6 h. Kylee Reynolds.

## 2019-07-10 NOTE — PLAN OF CARE
Problem: Falls - Risk of:  Goal: Will remain free from falls  Description  Will remain free from falls  Outcome: Ongoing     Problem: Risk for Impaired Skin Integrity  Goal: Tissue integrity - skin and mucous membranes  Description  Structural intactness and normal physiological function of skin and  mucous membranes. Outcome: Ongoing     Problem: Nutrition  Goal: Optimal nutrition therapy  7/10/2019 1156 by Hernesto Dempsey RD, DAJUAN  Outcome: Ongoing     Problem: Fluid Volume - Imbalance:  Goal: Absence of imbalanced fluid volume signs and symptoms  Description  Absence of imbalanced fluid volume signs and symptoms  Outcome: Ongoing     Problem: Gas Exchange - Impaired:  Goal: Levels of oxygenation will improve  Description  Levels of oxygenation will improve  Outcome: Ongoing  Pt. On vent support     Problem: Pain:  Goal: Control of acute pain  Description  Control of acute pain  Outcome: Ongoing  Pt. On continuous medications for pain.       Problem: Skin Integrity - Impaired:  Goal: Absence of new skin breakdown  Description  Absence of new skin breakdown  Outcome: Ongoing

## 2019-07-10 NOTE — ED PROVIDER NOTES
TAKE 1 TABLET BY MOUTH EVERY DAY  1    atorvastatin (LIPITOR) 40 MG tablet Take 40 mg by mouth daily       No Known Allergies    REVIEW OF SYSTEMS  10 systems reviewed, pertinent positives per HPI otherwise noted to be negative. PHYSICAL EXAM  Ht 5' 10\" (1.778 m)   Wt 165 lb (74.8 kg)   BMI 23.68 kg/m²   GENERAL APPEARANCE: Unresponsive snoring respirations  HEAD: Normocephalic. Atraumatic. EYES: PERRL. EOM's grossly intact. ENT: Mucous membranes are moist.   NECK: Supple. HEART: RRR. LUNGS: Snoring respirations  BACK: No midline spinal tenderness or step-off. ABDOMEN: Soft. Non-distended. Non-tender. No guarding or rebound. Normal bowel sounds. EXTREMITIES: No peripheral edema. All extremities neurovascularly intact. : Deferred  SKIN: Warm and dry. No acute rashes. NEUROLOGICAL: Unresponsive. No gross facial drooping. LABS  I have reviewed all labs for this visit.    Results for orders placed or performed during the hospital encounter of 07/09/19   CBC Auto Differential   Result Value Ref Range    WBC 11.0 4.0 - 11.0 K/uL    RBC 5.23 4.20 - 5.90 M/uL    Hemoglobin 16.4 13.5 - 17.5 g/dL    Hematocrit 48.2 40.5 - 52.5 %    MCV 92.2 80.0 - 100.0 fL    MCH 31.4 26.0 - 34.0 pg    MCHC 34.1 31.0 - 36.0 g/dL    RDW 14.6 12.4 - 15.4 %    Platelets 509 823 - 355 K/uL    MPV 8.5 5.0 - 10.5 fL    Neutrophils % 47.4 %    Lymphocytes % 44.8 %    Monocytes % 5.8 %    Eosinophils % 1.4 %    Basophils % 0.6 %    Neutrophils # 5.2 1.7 - 7.7 K/uL    Lymphocytes # 4.9 1.0 - 5.1 K/uL    Monocytes # 0.6 0.0 - 1.3 K/uL    Eosinophils # 0.2 0.0 - 0.6 K/uL    Basophils # 0.1 0.0 - 0.2 K/uL   Comprehensive Metabolic Panel   Result Value Ref Range    Sodium 142 136 - 145 mmol/L    Potassium 3.5 3.5 - 5.1 mmol/L    Chloride 101 99 - 110 mmol/L    CO2 22 21 - 32 mmol/L    Anion Gap 19 (H) 3 - 16    Glucose 172 (H) 70 - 99 mg/dL    BUN 30 (H) 7 - 20 mg/dL    CREATININE 0.8 0.8 - 1.3 mg/dL    GFR Non-African American IMPRESSION  1. Cardiac arrest ([address]    2. Ventricular tachycardia ([address]    3. Acute respiratory failure with hypoxia (HCC)    4. Elevated troponin    5. Lactic acidosis        Height 5' 10\" (1.778 m), weight 165 lb (74.8 kg). DISPOSITION  Noble Bolden was admitted in stable condition to the ICU. This chart was generated in part by using Dragon Dictation system and may contain errors related to that system including errors in grammar, punctuation, and spelling, as well as words and phrases that may be inappropriate. When dictating, effort is made to correct spelling/grammar errors. If there are any questions or concerns please feel free to contact the dictating provider for clarification.      Analisa Maria DO  EMERGENCY MEDICINE        Haily Gallardo DO  07/09/19 0300

## 2019-07-11 LAB
A/G RATIO: 1.4 (ref 1.1–2.2)
ALBUMIN SERPL-MCNC: 3 G/DL (ref 3.4–5)
ALP BLD-CCNC: 66 U/L (ref 40–129)
ALT SERPL-CCNC: 62 U/L (ref 10–40)
ANION GAP SERPL CALCULATED.3IONS-SCNC: 10 MMOL/L (ref 3–16)
ANION GAP SERPL CALCULATED.3IONS-SCNC: 11 MMOL/L (ref 3–16)
ANION GAP SERPL CALCULATED.3IONS-SCNC: 12 MMOL/L (ref 3–16)
ANION GAP SERPL CALCULATED.3IONS-SCNC: 12 MMOL/L (ref 3–16)
ANION GAP SERPL CALCULATED.3IONS-SCNC: 9 MMOL/L (ref 3–16)
APTT: 46.8 SEC (ref 26–36)
APTT: 60.6 SEC (ref 26–36)
APTT: 95.6 SEC (ref 26–36)
AST SERPL-CCNC: 100 U/L (ref 15–37)
BASE EXCESS ARTERIAL: -7.2 MMOL/L (ref -3–3)
BASE EXCESS ARTERIAL: -7.6 MMOL/L (ref -3–3)
BILIRUB SERPL-MCNC: 0.5 MG/DL (ref 0–1)
BUN BLDV-MCNC: 31 MG/DL (ref 7–20)
BUN BLDV-MCNC: 32 MG/DL (ref 7–20)
BUN BLDV-MCNC: 33 MG/DL (ref 7–20)
BUN BLDV-MCNC: 34 MG/DL (ref 7–20)
CALCIUM IONIZED: 0.74 MMOL/L (ref 1.12–1.32)
CALCIUM IONIZED: 1.04 MMOL/L (ref 1.12–1.32)
CALCIUM IONIZED: 1.06 MMOL/L (ref 1.12–1.32)
CALCIUM IONIZED: 1.07 MMOL/L (ref 1.12–1.32)
CALCIUM IONIZED: 1.08 MMOL/L (ref 1.12–1.32)
CALCIUM IONIZED: 1.09 MMOL/L (ref 1.12–1.32)
CALCIUM IONIZED: 1.09 MMOL/L (ref 1.12–1.32)
CALCIUM SERPL-MCNC: 7.6 MG/DL (ref 8.3–10.6)
CALCIUM SERPL-MCNC: 7.7 MG/DL (ref 8.3–10.6)
CALCIUM SERPL-MCNC: 7.8 MG/DL (ref 8.3–10.6)
CALCIUM SERPL-MCNC: 7.9 MG/DL (ref 8.3–10.6)
CALCIUM SERPL-MCNC: 7.9 MG/DL (ref 8.3–10.6)
CALCIUM SERPL-MCNC: 8 MG/DL (ref 8.3–10.6)
CALCIUM SERPL-MCNC: 8.1 MG/DL (ref 8.3–10.6)
CALCIUM SERPL-MCNC: 8.2 MG/DL (ref 8.3–10.6)
CARBOXYHEMOGLOBIN ARTERIAL: 0.9 % (ref 0–1.5)
CARBOXYHEMOGLOBIN ARTERIAL: 0.9 % (ref 0–1.5)
CHLORIDE BLD-SCNC: 106 MMOL/L (ref 99–110)
CHLORIDE BLD-SCNC: 107 MMOL/L (ref 99–110)
CHLORIDE BLD-SCNC: 107 MMOL/L (ref 99–110)
CHLORIDE BLD-SCNC: 108 MMOL/L (ref 99–110)
CHLORIDE BLD-SCNC: 109 MMOL/L (ref 99–110)
CHLORIDE BLD-SCNC: 110 MMOL/L (ref 99–110)
CHLORIDE BLD-SCNC: 110 MMOL/L (ref 99–110)
CO2: 19 MMOL/L (ref 21–32)
CO2: 20 MMOL/L (ref 21–32)
CO2: 22 MMOL/L (ref 21–32)
CREAT SERPL-MCNC: 0.8 MG/DL (ref 0.8–1.3)
CREAT SERPL-MCNC: 0.9 MG/DL (ref 0.8–1.3)
CREAT SERPL-MCNC: 0.9 MG/DL (ref 0.8–1.3)
CREAT SERPL-MCNC: 1 MG/DL (ref 0.8–1.3)
CREAT SERPL-MCNC: 1 MG/DL (ref 0.8–1.3)
CREAT SERPL-MCNC: 1.1 MG/DL (ref 0.8–1.3)
CREAT SERPL-MCNC: 1.3 MG/DL (ref 0.8–1.3)
CREAT SERPL-MCNC: 1.4 MG/DL (ref 0.8–1.3)
GFR AFRICAN AMERICAN: 60
GFR AFRICAN AMERICAN: >60
GFR NON-AFRICAN AMERICAN: 49
GFR NON-AFRICAN AMERICAN: 54
GFR NON-AFRICAN AMERICAN: >60
GLOBULIN: 2.2 G/DL
GLUCOSE BLD-MCNC: 111 MG/DL (ref 70–99)
GLUCOSE BLD-MCNC: 115 MG/DL (ref 70–99)
GLUCOSE BLD-MCNC: 115 MG/DL (ref 70–99)
GLUCOSE BLD-MCNC: 121 MG/DL (ref 70–99)
GLUCOSE BLD-MCNC: 123 MG/DL (ref 70–99)
GLUCOSE BLD-MCNC: 128 MG/DL (ref 70–99)
GLUCOSE BLD-MCNC: 131 MG/DL (ref 70–99)
GLUCOSE BLD-MCNC: 135 MG/DL (ref 70–99)
GLUCOSE BLD-MCNC: 137 MG/DL (ref 70–99)
GLUCOSE BLD-MCNC: 143 MG/DL (ref 70–99)
GLUCOSE BLD-MCNC: 144 MG/DL (ref 70–99)
GLUCOSE BLD-MCNC: 149 MG/DL (ref 70–99)
HCO3 ARTERIAL: 17.8 MMOL/L (ref 21–29)
HCO3 ARTERIAL: 18.5 MMOL/L (ref 21–29)
HCT VFR BLD CALC: 39.9 % (ref 40.5–52.5)
HCT VFR BLD CALC: 40.8 % (ref 40.5–52.5)
HCT VFR BLD CALC: 42.8 % (ref 40.5–52.5)
HCT VFR BLD CALC: 43.8 % (ref 40.5–52.5)
HEMOGLOBIN, ART, EXTENDED: 15.6 G/DL (ref 13.5–17.5)
HEMOGLOBIN, ART, EXTENDED: 15.9 G/DL (ref 13.5–17.5)
HEMOGLOBIN: 13.8 G/DL (ref 13.5–17.5)
HEMOGLOBIN: 13.9 G/DL (ref 13.5–17.5)
HEMOGLOBIN: 14.7 G/DL (ref 13.5–17.5)
HEMOGLOBIN: 15.1 G/DL (ref 13.5–17.5)
LACTIC ACID: 2.6 MMOL/L (ref 0.4–2)
MAGNESIUM: 1.7 MG/DL (ref 1.8–2.4)
MAGNESIUM: 1.8 MG/DL (ref 1.8–2.4)
MAGNESIUM: 1.9 MG/DL (ref 1.8–2.4)
MAGNESIUM: 1.9 MG/DL (ref 1.8–2.4)
MCH RBC QN AUTO: 30.9 PG (ref 26–34)
MCH RBC QN AUTO: 30.9 PG (ref 26–34)
MCH RBC QN AUTO: 31.1 PG (ref 26–34)
MCH RBC QN AUTO: 31.9 PG (ref 26–34)
MCHC RBC AUTO-ENTMCNC: 34 G/DL (ref 31–36)
MCHC RBC AUTO-ENTMCNC: 34.3 G/DL (ref 31–36)
MCHC RBC AUTO-ENTMCNC: 34.4 G/DL (ref 31–36)
MCHC RBC AUTO-ENTMCNC: 34.6 G/DL (ref 31–36)
MCV RBC AUTO: 89.8 FL (ref 80–100)
MCV RBC AUTO: 90.2 FL (ref 80–100)
MCV RBC AUTO: 91.4 FL (ref 80–100)
MCV RBC AUTO: 92.3 FL (ref 80–100)
METHEMOGLOBIN ARTERIAL: 0.6 %
METHEMOGLOBIN ARTERIAL: 0.6 %
O2 CONTENT ARTERIAL: 21 ML/DL
O2 CONTENT ARTERIAL: 21 ML/DL
O2 SAT, ARTERIAL: 95.3 %
O2 SAT, ARTERIAL: 97.5 %
O2 THERAPY: ABNORMAL
O2 THERAPY: ABNORMAL
PCO2 ARTERIAL: 36.6 MMHG (ref 35–45)
PCO2 ARTERIAL: 38.5 MMHG (ref 35–45)
PDW BLD-RTO: 14.5 % (ref 12.4–15.4)
PDW BLD-RTO: 14.6 % (ref 12.4–15.4)
PDW BLD-RTO: 14.8 % (ref 12.4–15.4)
PDW BLD-RTO: 15.1 % (ref 12.4–15.4)
PH ARTERIAL: 7.3 (ref 7.35–7.45)
PH ARTERIAL: 7.31 (ref 7.35–7.45)
PH VENOUS: 7.21 (ref 7.35–7.45)
PH VENOUS: 7.22 (ref 7.35–7.45)
PH VENOUS: 7.23 (ref 7.35–7.45)
PH VENOUS: 7.24 (ref 7.35–7.45)
PH VENOUS: 7.25 (ref 7.35–7.45)
PH VENOUS: 7.25 (ref 7.35–7.45)
PH VENOUS: 7.26 (ref 7.35–7.45)
PH VENOUS: 7.26 (ref 7.35–7.45)
PH VENOUS: 7.3 (ref 7.35–7.45)
PH VENOUS: 7.31 (ref 7.35–7.45)
PH VENOUS: 7.31 (ref 7.35–7.45)
PHOSPHORUS: 5.3 MG/DL (ref 2.5–4.9)
PHOSPHORUS: 5.5 MG/DL (ref 2.5–4.9)
PHOSPHORUS: 5.7 MG/DL (ref 2.5–4.9)
PHOSPHORUS: 5.8 MG/DL (ref 2.5–4.9)
PHOSPHORUS: 6.1 MG/DL (ref 2.5–4.9)
PHOSPHORUS: 6.3 MG/DL (ref 2.5–4.9)
PHOSPHORUS: 6.7 MG/DL (ref 2.5–4.9)
PHOSPHORUS: 7.2 MG/DL (ref 2.5–4.9)
PHOSPHORUS: 7.3 MG/DL (ref 2.5–4.9)
PLATELET # BLD: 140 K/UL (ref 135–450)
PLATELET # BLD: 152 K/UL (ref 135–450)
PLATELET # BLD: 172 K/UL (ref 135–450)
PLATELET # BLD: 182 K/UL (ref 135–450)
PMV BLD AUTO: 8.7 FL (ref 5–10.5)
PMV BLD AUTO: 8.9 FL (ref 5–10.5)
PMV BLD AUTO: 9.1 FL (ref 5–10.5)
PMV BLD AUTO: 9.3 FL (ref 5–10.5)
PO2 ARTERIAL: 103.4 MMHG (ref 75–108)
PO2 ARTERIAL: 81.6 MMHG (ref 75–108)
POC ACT LR: 142 SEC
POTASSIUM REFLEX MAGNESIUM: 3.7 MMOL/L (ref 3.5–5.1)
POTASSIUM SERPL-SCNC: 3.7 MMOL/L (ref 3.5–5.1)
POTASSIUM SERPL-SCNC: 3.8 MMOL/L (ref 3.5–5.1)
POTASSIUM SERPL-SCNC: 3.8 MMOL/L (ref 3.5–5.1)
POTASSIUM SERPL-SCNC: 3.9 MMOL/L (ref 3.5–5.1)
POTASSIUM SERPL-SCNC: 4 MMOL/L (ref 3.5–5.1)
POTASSIUM SERPL-SCNC: 4.1 MMOL/L (ref 3.5–5.1)
POTASSIUM SERPL-SCNC: 4.4 MMOL/L (ref 3.5–5.1)
POTASSIUM SERPL-SCNC: 4.6 MMOL/L (ref 3.5–5.1)
POTASSIUM SERPL-SCNC: 4.7 MMOL/L (ref 3.5–5.1)
RBC # BLD: 4.32 M/UL (ref 4.2–5.9)
RBC # BLD: 4.46 M/UL (ref 4.2–5.9)
RBC # BLD: 4.75 M/UL (ref 4.2–5.9)
RBC # BLD: 4.88 M/UL (ref 4.2–5.9)
SODIUM BLD-SCNC: 137 MMOL/L (ref 136–145)
SODIUM BLD-SCNC: 137 MMOL/L (ref 136–145)
SODIUM BLD-SCNC: 138 MMOL/L (ref 136–145)
SODIUM BLD-SCNC: 139 MMOL/L (ref 136–145)
SODIUM BLD-SCNC: 140 MMOL/L (ref 136–145)
SODIUM BLD-SCNC: 140 MMOL/L (ref 136–145)
SODIUM BLD-SCNC: 141 MMOL/L (ref 136–145)
SODIUM BLD-SCNC: 141 MMOL/L (ref 136–145)
SODIUM BLD-SCNC: 142 MMOL/L (ref 136–145)
SODIUM BLD-SCNC: 142 MMOL/L (ref 136–145)
TCO2 ARTERIAL: 19 MMOL/L
TCO2 ARTERIAL: 19.7 MMOL/L
TOTAL PROTEIN: 5.2 G/DL (ref 6.4–8.2)
WBC # BLD: 13.1 K/UL (ref 4–11)
WBC # BLD: 14.4 K/UL (ref 4–11)
WBC # BLD: 14.7 K/UL (ref 4–11)
WBC # BLD: 15.8 K/UL (ref 4–11)

## 2019-07-11 PROCEDURE — 84100 ASSAY OF PHOSPHORUS: CPT

## 2019-07-11 PROCEDURE — 2500000003 HC RX 250 WO HCPCS: Performed by: INTERNAL MEDICINE

## 2019-07-11 PROCEDURE — 2100000000 HC CCU R&B

## 2019-07-11 PROCEDURE — 94761 N-INVAS EAR/PLS OXIMETRY MLT: CPT

## 2019-07-11 PROCEDURE — 2580000003 HC RX 258: Performed by: INTERNAL MEDICINE

## 2019-07-11 PROCEDURE — 2580000003 HC RX 258: Performed by: EMERGENCY MEDICINE

## 2019-07-11 PROCEDURE — 83605 ASSAY OF LACTIC ACID: CPT

## 2019-07-11 PROCEDURE — 6360000002 HC RX W HCPCS: Performed by: INTERNAL MEDICINE

## 2019-07-11 PROCEDURE — 99291 CRITICAL CARE FIRST HOUR: CPT | Performed by: INTERNAL MEDICINE

## 2019-07-11 PROCEDURE — 94770 HC ETCO2 MONITOR DAILY: CPT

## 2019-07-11 PROCEDURE — C9113 INJ PANTOPRAZOLE SODIUM, VIA: HCPCS | Performed by: INTERNAL MEDICINE

## 2019-07-11 PROCEDURE — 93005 ELECTROCARDIOGRAM TRACING: CPT | Performed by: INTERNAL MEDICINE

## 2019-07-11 PROCEDURE — 82803 BLOOD GASES ANY COMBINATION: CPT

## 2019-07-11 PROCEDURE — 94750 HC PULMONARY COMPLIANCE STUDY: CPT

## 2019-07-11 PROCEDURE — 2700000000 HC OXYGEN THERAPY PER DAY

## 2019-07-11 PROCEDURE — 85730 THROMBOPLASTIN TIME PARTIAL: CPT

## 2019-07-11 PROCEDURE — 82330 ASSAY OF CALCIUM: CPT

## 2019-07-11 PROCEDURE — 6360000002 HC RX W HCPCS: Performed by: EMERGENCY MEDICINE

## 2019-07-11 PROCEDURE — 85027 COMPLETE CBC AUTOMATED: CPT

## 2019-07-11 PROCEDURE — 6370000000 HC RX 637 (ALT 250 FOR IP): Performed by: INTERNAL MEDICINE

## 2019-07-11 PROCEDURE — 80053 COMPREHEN METABOLIC PANEL: CPT

## 2019-07-11 PROCEDURE — 83735 ASSAY OF MAGNESIUM: CPT

## 2019-07-11 PROCEDURE — 94003 VENT MGMT INPAT SUBQ DAY: CPT

## 2019-07-11 PROCEDURE — 80048 BASIC METABOLIC PNL TOTAL CA: CPT

## 2019-07-11 RX ORDER — 0.9 % SODIUM CHLORIDE 0.9 %
500 INTRAVENOUS SOLUTION INTRAVENOUS ONCE
Status: COMPLETED | OUTPATIENT
Start: 2019-07-11 | End: 2019-07-11

## 2019-07-11 RX ORDER — SODIUM CHLORIDE 9 MG/ML
INJECTION, SOLUTION INTRAVENOUS CONTINUOUS
Status: DISCONTINUED | OUTPATIENT
Start: 2019-07-11 | End: 2019-07-12

## 2019-07-11 RX ORDER — FUROSEMIDE 10 MG/ML
40 INJECTION INTRAMUSCULAR; INTRAVENOUS ONCE
Status: COMPLETED | OUTPATIENT
Start: 2019-07-11 | End: 2019-07-11

## 2019-07-11 RX ORDER — HEPARIN SODIUM 1000 [USP'U]/ML
2000 INJECTION, SOLUTION INTRAVENOUS; SUBCUTANEOUS ONCE
Status: COMPLETED | OUTPATIENT
Start: 2019-07-11 | End: 2019-07-11

## 2019-07-11 RX ADMIN — MUPIROCIN: 20 OINTMENT TOPICAL at 10:32

## 2019-07-11 RX ADMIN — HEPARIN SODIUM 2000 UNITS: 1000 INJECTION, SOLUTION INTRAVENOUS; SUBCUTANEOUS at 19:49

## 2019-07-11 RX ADMIN — Medication 15 ML: at 10:32

## 2019-07-11 RX ADMIN — SODIUM CHLORIDE: 9 INJECTION, SOLUTION INTRAVENOUS at 14:23

## 2019-07-11 RX ADMIN — Medication 15 ML: at 21:24

## 2019-07-11 RX ADMIN — VASOPRESSIN 0.04 UNITS/MIN: 20 INJECTION INTRAVENOUS at 19:29

## 2019-07-11 RX ADMIN — FENTANYL CITRATE 75 MCG/HR: 50 INJECTION INTRAVENOUS at 17:58

## 2019-07-11 RX ADMIN — FENTANYL CITRATE 125 MCG/HR: 50 INJECTION INTRAVENOUS at 02:30

## 2019-07-11 RX ADMIN — CARBOXYMETHYLCELLULOSE SODIUM 1 DROP: 10 GEL OPHTHALMIC at 21:25

## 2019-07-11 RX ADMIN — Medication 10 ML: at 21:25

## 2019-07-11 RX ADMIN — SODIUM CHLORIDE 500 ML: 9 INJECTION, SOLUTION INTRAVENOUS at 00:50

## 2019-07-11 RX ADMIN — CALCIUM GLUCONATE 1 G: 98 INJECTION, SOLUTION INTRAVENOUS at 21:23

## 2019-07-11 RX ADMIN — SODIUM CHLORIDE 0.5 MCG/KG/MIN: 9 INJECTION, SOLUTION INTRAVENOUS at 02:34

## 2019-07-11 RX ADMIN — FUROSEMIDE 40 MG: 10 INJECTION, SOLUTION INTRAMUSCULAR; INTRAVENOUS at 06:59

## 2019-07-11 RX ADMIN — CARBOXYMETHYLCELLULOSE SODIUM 1 DROP: 10 GEL OPHTHALMIC at 10:34

## 2019-07-11 RX ADMIN — PANTOPRAZOLE SODIUM 40 MG: 40 INJECTION, POWDER, FOR SOLUTION INTRAVENOUS at 10:39

## 2019-07-11 RX ADMIN — NOREPINEPHRINE BITARTRATE 30 MCG/MIN: 1 INJECTION INTRAVENOUS at 22:10

## 2019-07-11 RX ADMIN — SODIUM CHLORIDE: 9 INJECTION, SOLUTION INTRAVENOUS at 03:45

## 2019-07-11 RX ADMIN — Medication 10 ML: at 10:33

## 2019-07-11 RX ADMIN — CARBOXYMETHYLCELLULOSE SODIUM 1 DROP: 10 GEL OPHTHALMIC at 14:22

## 2019-07-11 RX ADMIN — MUPIROCIN: 20 OINTMENT TOPICAL at 21:25

## 2019-07-11 RX ADMIN — AMIODARONE HYDROCHLORIDE 0.5 MG/MIN: 50 INJECTION, SOLUTION INTRAVENOUS at 17:57

## 2019-07-11 RX ADMIN — FENTANYL CITRATE 125 MCG/HR: 50 INJECTION INTRAVENOUS at 10:28

## 2019-07-11 RX ADMIN — NOREPINEPHRINE BITARTRATE 20 MCG/MIN: 1 INJECTION INTRAVENOUS at 08:38

## 2019-07-11 RX ADMIN — MIDAZOLAM 9 MG/HR: 5 INJECTION INTRAMUSCULAR; INTRAVENOUS at 10:27

## 2019-07-11 ASSESSMENT — PULMONARY FUNCTION TESTS
PIF_VALUE: 17
PIF_VALUE: 20
PIF_VALUE: 20
PIF_VALUE: 19
PIF_VALUE: 21
PIF_VALUE: 24
PIF_VALUE: 19
PIF_VALUE: 23
PIF_VALUE: 25
PIF_VALUE: 21
PIF_VALUE: 24
PIF_VALUE: 25
PIF_VALUE: 21
PIF_VALUE: 21
PIF_VALUE: 26
PIF_VALUE: 20
PIF_VALUE: 22
PIF_VALUE: 19
PIF_VALUE: 22

## 2019-07-11 NOTE — PROGRESS NOTES
07/11/19 0818   Vent Information   Vent Type 840   Vent Mode AC/VC   Vt Ordered 500 mL   Rate Set 20 bmp   Peak Flow 45 L/min   Pressure Support 0 cmH20   FiO2  50 %   Sensitivity 3   PEEP/CPAP 5   Cuff Pressure (cm H2O) 30 cm H2O   Humidification Source Heated wire   Humidification Temp 37   Humidification Temp Measured 36.5   Vent Patient Data   Peak Inspiratory Pressure 26 cmH2O   Mean Airway Pressure 11 cmH20   Rate Measured 20 br/min   Vt Exhaled 529 mL   Minute Volume 10.6 Liters   I:E Ratio 1:1.50   Plateau Pressure 17 [id number]   Static Compliance 44 mL/cmH2O   Dynamic Compliance 25 mL/cmH2O   Spontaneous Breathing Trial (SBT) RT Doc   Pulse 74   SpO2 100 %   Breath Sounds   Right Upper Lobe Clear   Right Middle Lobe Diminished   Right Lower Lobe Clear   Left Upper Lobe Clear   Left Lower Lobe Diminished   Additional Respiratory  Assessments   Resp 20   Position Semi-Stuart's   Alarm Settings   High Pressure Alarm 45 cmH2O   Low Minute Volume Alarm 3 L/min   High Respiratory Rate 40 br/min   Low Exhaled Vt  300 mL   Patient Observation   Observations Ambu @ bedside. ETT moved to left side   ETT (adult)   Placement Date/Time: 07/09/19 2140   Preoxygenation: Yes  Mask Ventilation: Ventilated by mask with oral airway (2)  Technique: Direct laryngoscopy;Rapid sequence;Stylet; Video laryngoscopy  Type: Cuffed  Tube Size: 8 mm  Laryngoscope: GlideScope  Ray. ..    Secured at 24 cm   Measured From Lips   ET Placement Left   Secured By Commercial tube petty   Site Condition Dry

## 2019-07-11 NOTE — CONSULTS
food     Signs and symptoms:  as evidenced by NPO status due to medical condition, Intubation    Objective Information:  · Nutrition-Focused Physical Findings:    · Wound Type: None  · Current Nutrition Therapies:  · Oral Diet Orders: NPO   · Oral Diet intake: NPO  · Oral Nutrition Supplement (ONS) Orders: None  · ONS intake: NPO  · Tube Feeding (TF) Orders:   · Feeding Route: Orogastric(or most medically appropriate route)  · Formula: 1.5 Calorie with Fiber  · Rate (ml/hr):75    · Volume (ml/day): 1500(based on 20 hr run time to account for patient care)  · Duration: Continuous  · Water Flushes: 150 mL q4 hrs  · Current TF & Flush Orders Provides: N/A  · Goal TF & Flush Orders Provides: 1.5 Calorie Formula with Fiber (Jevity 1.5) at goal rate of 75 mL/hr. Provides: 2250 kcal, 96 gm protein, and 2040 mL free water from TF and flushes.    · Additional Calories: Not on propofol  · Anthropometric Measures:  · Ht: 5' 10\" (177.8 cm)   · Current Body Wt: 168 lb (76.2 kg)  · Usual Body Wt: (unable to obtain)  · Weight Change: unable to assess, lack of wt hx in EMR/patient unable to provide weight hx   · Ideal Body Wt: 166 lb (75.3 kg), % Ideal Body 101%  · BMI Classification: BMI 18.5 - 24.9 Normal Weight    Nutrition Interventions:   Continue NPO, Start Tube Feeding(Once medically stable/appropriate)  Continued Inpatient Monitoring    Nutrition Evaluation:   · Evaluation: Goals set   · Goals: Patient will tolerate tube feeding at goal rate    · Monitoring: Nutrition Progression, Weight, Pertinent Labs, Monitor Bowel Function, I&O, TF Intake, TF Tolerance      Electronically signed by Efrem Best RD, LD on 7/11/19 at 9:45 AM  Contact Number: [phone] Robert: [phone]

## 2019-07-11 NOTE — PROGRESS NOTES
Hospitalist Progress Note    Date of Admission: 7/9/2019    Chief Complaint: Cardiac arrest    Hospital Course: 65 yo M presented with witnessed arrest at home. His family started CPR and EMS arrived quickly. He was shocked for VT x 3 at home with ROSC. He was shocked another time on route to ED. In ED he was intubated for GCS of 4. Cardiology took him for Angiogram which showed multi-vessel disease but no definite culprit lesion. Started on hypothermia protocol. Subjective: Intubated, sedated  Currently rewarming from hypothermia protocol   Mild vasopressor support    Labs:   Recent Labs     07/10/19  1720 07/11/19  0000 07/11/19  0510   WBC 13.1* 14.4* 14.7*   HGB 14.4 15.1 14.7   HCT 43.2 43.8 42.8    182 172     Recent Labs     07/11/19  0312 07/11/19  0510 07/11/19  0602 07/11/19  0815    139 139 141   K 3.7 3.7 3.7 3.8    108 108 109   CO2 19* 20* 20* 22   BUN 31* 32* 32* 32*   CREATININE 0.8 0.8 0.8 0.8   CALCIUM 7.9* 7.8* 8.1* 7.9*   PHOS 5.3*  --  5.3* 5.5*     Recent Labs     07/09/19  2130 07/10/19  0343 07/11/19  0510   AST 95* 151* 100*   ALT 80* 89* 62*   BILITOT 0.9 0.9 0.5   ALKPHOS 92 79 66     Recent Labs     07/09/19  2130 07/10/19  0343   INR 1.04 1.18*       Physical Exam Performed:    BP 83/68   Pulse 73   Temp 92.1 °F (33.4 °C) (Core)   Resp 20   Ht 5' 10\" (1.778 m)   Wt 176 lb 9.4 oz (80.1 kg)   SpO2 98%   BMI 25.34 kg/m²   General appearance: Intubated, sedated, no acute distress  Eyes: Conjunctivae normal, sclera non-icteric. PERRL. HENT: Oropharynx clear, mucous membranes moist.  Respiratory: On vent. Limited examination, grossly clear to auscultation bilaterally without wheezes, crackles or rhonchi. Cardiovascular: Regular rate and rhythm, normal S1/S2, no murmurs. No JVD. No significant LE edema. Abdomen: Soft, non-tender; non-distended, normal bowel sounds.    Neuro: Limited exam  Psychiatric: Intubated, sedated, paralyzed, limited exam.

## 2019-07-11 NOTE — PROGRESS NOTES
07/11/19 0332   Vent Information   Vent Type 840   Vent Mode AC/VC   Vt Ordered 500 mL   Rate Set 20 bmp   Peak Flow 45 L/min   Pressure Support 0 cmH20   FiO2  50 %   Sensitivity 3   PEEP/CPAP 5   Cuff Pressure (cm H2O) 30 cm H2O   Humidification Source Heated wire   Humidification Temp Measured 37   Vent Patient Data   Peak Inspiratory Pressure 17 cmH2O   Mean Airway Pressure 10 cmH20   Rate Measured 20 br/min   Vt Exhaled 525 mL   Minute Volume 10.5 Liters   I:E Ratio 1:1.50   Cough/Sputum   Sputum How Obtained Endotracheal;Suctioned   Cough Non-productive   Sputum Amount None   Sputum Color None   Tenacity None   Spontaneous Breathing Trial (SBT) RT Doc   Pulse 72   SpO2 95 %   Breath Sounds   Right Upper Lobe Diminished   Right Middle Lobe Diminished   Right Lower Lobe Diminished   Left Upper Lobe Diminished   Left Lower Lobe Diminished   Additional Respiratory  Assessments   Resp 20   End Tidal CO2 20 (%)   Position Semi-Stuart's   Alarm Settings   High Pressure Alarm 45 cmH2O   Low Minute Volume Alarm 3 L/min   High Respiratory Rate 40 br/min   Low Exhaled Vt  300 mL   ETT (adult)   Placement Date/Time: 07/09/19 2140   Preoxygenation: Yes  Mask Ventilation: Ventilated by mask with oral airway (2)  Technique: Direct laryngoscopy;Rapid sequence;Stylet; Video laryngoscopy  Type: Cuffed  Tube Size: 8 mm  Laryngoscope: GlideScope  Ray. ..    Secured at 24 cm   Measured From [address] [place] [address],[address] By Commercial tube petty   Site Condition Dry   Cuff Pressure 30 cm H2O

## 2019-07-11 NOTE — PROGRESS NOTES
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 on the cardiac cath and a CT surgery consult has been requested for evaluation  · Monitor I/O and BMP  · Correct electrolytes on PRN basis   · Trend blood sugars   · Monitor for any hypoglycemia  · Patient's neurostatus to be evaluated after the rewarming   · Further course of management as per the clinical status after rewarming  · Monitor for any bleeding  · PUD prophylaxis     Case d/w family and ICU team     Long term prognosis appears to be guarded     Critical Care time spent -45 minutes(exclusive of any procedures)      Electronically signed by:  Brijesh Kapoor MD    7/11/2019    4:44 PM.

## 2019-07-11 NOTE — FLOWSHEET NOTE
2000 Pt assessed, see note for details. Pt remains intubated, sedated, and paralyzed, see MAR for details. Temporary pacemaker in place. Pt remains hypothermic per protocol. 2100 Pt's daughter at bedside, up dated with current plan of care, all questions answered. 0000 Assessment complete, see note for details. [address] started. 0400 Assessment compete, see note for details. [id number] Text page sent to hospitalitis about Pt's low urine output despite the bolus and maintance fluids. 5875 Call back received, No new order received. 0650 New order received, see MAR for details.

## 2019-07-11 NOTE — FLOWSHEET NOTE
4 Eyes Skin Assessment     The patient is being assess for   Shift Handoff    I agree that 2 RN's have performed a thorough Head to Toe Skin Assessment on the patient. ALL assessment sites listed below have been assessed. Areas assessed by both nurses:   [x]   Head, Face, and Ears   [x]   Shoulders, Back, and Chest, Abdomen  [x]   Arms, Elbows, and Hands   [x]   Coccyx, Sacrum, and Ischium  [x]   Legs, Feet, and Heels          **SHARE this note so that the co-signing nurse is able to place an eSignature**    Co-signer eSignature: Electronically signed by Pallavi Brock RN on 7/11/19 at 7:18 AM    Does the Patient have Skin Breakdown?   No          Sonny Prevention initiated:  Yes   Wound Care Orders initiated:  NA      [facility] nurse consulted for Pressure Injury (Stage 3,4, Unstageable, DTI, NWPT, Complex wounds)and New or Established Ostomies:  NA      Primary Nurse eSignature: {Esignature:[id number]}

## 2019-07-11 NOTE — PROGRESS NOTES
07/11/19 1138   Vent Information   Vent Type 840   Vent Mode AC/VC   Vt Ordered 500 mL   Rate Set 20 bmp   Peak Flow 45 L/min   Pressure Support 0 cmH20   FiO2  40 %   Sensitivity 3   PEEP/CPAP 5   Cuff Pressure (cm H2O) 30 cm H2O   Humidification Source Heated wire   Humidification Temp 37   Humidification Temp Measured 37   Vent Patient Data   Peak Inspiratory Pressure 21 cmH2O   Mean Airway Pressure 11 cmH20   Rate Measured 20 br/min   Vt Exhaled 461 mL   Minute Volume 9.39 Liters   I:E Ratio 1:1.50   Cough/Sputum   Sputum How Obtained Endotracheal   Cough Productive   Sputum Amount Small   Sputum Color Cloudy   Tenacity Thin   Spontaneous Breathing Trial (SBT) RT Doc   Pulse 72   SpO2 98 %   Breath Sounds   Right Upper Lobe Clear   Right Middle Lobe Diminished   Right Lower Lobe Clear   Left Upper Lobe Clear   Left Lower Lobe Diminished   Additional Respiratory  Assessments   Resp 20   Position Semi-Stuart's   Alarm Settings   High Pressure Alarm 45 cmH2O   Low Minute Volume Alarm 3 L/min   High Respiratory Rate 40 br/min   Low Exhaled Vt  300 mL   Patient Observation   Observations Ambu @ bedside. ETT moved to right side   ETT (adult)   Placement Date/Time: 07/09/19 2140   Preoxygenation: Yes  Mask Ventilation: Ventilated by mask with oral airway (2)  Technique: Direct laryngoscopy;Rapid sequence;Stylet; Video laryngoscopy  Type: Cuffed  Tube Size: 8 mm  Laryngoscope: GlideScope  Ray. ..    Secured at 24 cm   Measured From Lips   ET Placement Right   Secured By Commercial tube petty   Site Condition Dry

## 2019-07-11 NOTE — PROGRESS NOTES
Received bedside report from off going RN. Pt currently sedated, paralyzed, on vent and, arctic sun. Rewarming phase began at 0337. Pts skin was assessed. Turned and repositioned. Kirkpatrick in place. Orders verified. Pt able to follow commands. NG in place and placed on low wall suction, placement verified. Call light in reach, bed in lowest position, will continue to monitor.

## 2019-07-12 ENCOUNTER — APPOINTMENT (OUTPATIENT)
Dept: GENERAL RADIOLOGY | Age: 74
DRG: 286 | End: 2019-07-12
Payer: MEDICARE

## 2019-07-12 LAB
A/G RATIO: 1.2 (ref 1.1–2.2)
ALBUMIN SERPL-MCNC: 2.7 G/DL (ref 3.4–5)
ALP BLD-CCNC: 86 U/L (ref 40–129)
ALT SERPL-CCNC: 90 U/L (ref 10–40)
ANION GAP SERPL CALCULATED.3IONS-SCNC: 10 MMOL/L (ref 3–16)
ANION GAP SERPL CALCULATED.3IONS-SCNC: 12 MMOL/L (ref 3–16)
ANION GAP SERPL CALCULATED.3IONS-SCNC: 9 MMOL/L (ref 3–16)
APTT: 56.1 SEC (ref 26–36)
APTT: 64.3 SEC (ref 26–36)
AST SERPL-CCNC: 89 U/L (ref 15–37)
BASE EXCESS ARTERIAL: -8 (ref -3–3)
BILIRUB SERPL-MCNC: 0.3 MG/DL (ref 0–1)
BUN BLDV-MCNC: 34 MG/DL (ref 7–20)
BUN BLDV-MCNC: 35 MG/DL (ref 7–20)
BUN BLDV-MCNC: 35 MG/DL (ref 7–20)
CALCIUM IONIZED: 1.05 MMOL/L (ref 1.12–1.32)
CALCIUM IONIZED: 1.08 MMOL/L (ref 1.12–1.32)
CALCIUM IONIZED: 1.09 MMOL/L (ref 1.12–1.32)
CALCIUM IONIZED: 1.12 MMOL/L (ref 1.12–1.32)
CALCIUM IONIZED: 1.12 MMOL/L (ref 1.12–1.32)
CALCIUM SERPL-MCNC: 7.9 MG/DL (ref 8.3–10.6)
CALCIUM SERPL-MCNC: 7.9 MG/DL (ref 8.3–10.6)
CALCIUM SERPL-MCNC: 8 MG/DL (ref 8.3–10.6)
CALCIUM SERPL-MCNC: 8.1 MG/DL (ref 8.3–10.6)
CALCIUM SERPL-MCNC: 8.1 MG/DL (ref 8.3–10.6)
CHLORIDE BLD-SCNC: 109 MMOL/L (ref 99–110)
CHLORIDE BLD-SCNC: 110 MMOL/L (ref 99–110)
CHLORIDE BLD-SCNC: 111 MMOL/L (ref 99–110)
CO2: 18 MMOL/L (ref 21–32)
CO2: 19 MMOL/L (ref 21–32)
CREAT SERPL-MCNC: 1.3 MG/DL (ref 0.8–1.3)
CREAT SERPL-MCNC: 1.4 MG/DL (ref 0.8–1.3)
CREAT SERPL-MCNC: 1.4 MG/DL (ref 0.8–1.3)
CREAT SERPL-MCNC: 1.5 MG/DL (ref 0.8–1.3)
CREAT SERPL-MCNC: 1.5 MG/DL (ref 0.8–1.3)
EKG ATRIAL RATE: 125 BPM
EKG ATRIAL RATE: 23 BPM
EKG ATRIAL RATE: 52 BPM
EKG ATRIAL RATE: 54 BPM
EKG ATRIAL RATE: 55 BPM
EKG ATRIAL RATE: 73 BPM
EKG DIAGNOSIS: NORMAL
EKG P AXIS: 17 DEGREES
EKG P AXIS: 37 DEGREES
EKG P AXIS: 64 DEGREES
EKG P AXIS: 74 DEGREES
EKG P-R INTERVAL: 112 MS
EKG P-R INTERVAL: 174 MS
EKG P-R INTERVAL: 176 MS
EKG P-R INTERVAL: 206 MS
EKG Q-T INTERVAL: 432 MS
EKG Q-T INTERVAL: 450 MS
EKG Q-T INTERVAL: 516 MS
EKG Q-T INTERVAL: 560 MS
EKG Q-T INTERVAL: 562 MS
EKG Q-T INTERVAL: 618 MS
EKG QRS DURATION: 138 MS
EKG QRS DURATION: 176 MS
EKG QRS DURATION: 188 MS
EKG QRS DURATION: 190 MS
EKG QRS DURATION: 208 MS
EKG QRS DURATION: 210 MS
EKG QTC CALCULATION (BAZETT): 495 MS
EKG QTC CALCULATION (BAZETT): 522 MS
EKG QTC CALCULATION (BAZETT): 535 MS
EKG QTC CALCULATION (BAZETT): 560 MS
EKG QTC CALCULATION (BAZETT): 586 MS
EKG QTC CALCULATION (BAZETT): 623 MS
EKG R AXIS: -64 DEGREES
EKG R AXIS: -75 DEGREES
EKG R AXIS: -76 DEGREES
EKG R AXIS: -76 DEGREES
EKG R AXIS: -81 DEGREES
EKG R AXIS: 270 DEGREES
EKG T AXIS: -9 DEGREES
EKG T AXIS: 100 DEGREES
EKG T AXIS: 101 DEGREES
EKG T AXIS: 103 DEGREES
EKG T AXIS: 110 DEGREES
EKG T AXIS: 117 DEGREES
EKG VENTRICULAR RATE: 125 BPM
EKG VENTRICULAR RATE: 52 BPM
EKG VENTRICULAR RATE: 54 BPM
EKG VENTRICULAR RATE: 55 BPM
EKG VENTRICULAR RATE: 71 BPM
EKG VENTRICULAR RATE: 73 BPM
GFR AFRICAN AMERICAN: 55
GFR AFRICAN AMERICAN: 55
GFR AFRICAN AMERICAN: 60
GFR AFRICAN AMERICAN: 60
GFR AFRICAN AMERICAN: >60
GFR NON-AFRICAN AMERICAN: 45
GFR NON-AFRICAN AMERICAN: 45
GFR NON-AFRICAN AMERICAN: 49
GFR NON-AFRICAN AMERICAN: 49
GFR NON-AFRICAN AMERICAN: 54
GLOBULIN: 2.2 G/DL
GLUCOSE BLD-MCNC: 130 MG/DL (ref 70–99)
GLUCOSE BLD-MCNC: 135 MG/DL (ref 70–99)
GLUCOSE BLD-MCNC: 137 MG/DL (ref 70–99)
GLUCOSE BLD-MCNC: 137 MG/DL (ref 70–99)
GLUCOSE BLD-MCNC: 141 MG/DL (ref 70–99)
GLUCOSE BLD-MCNC: 141 MG/DL (ref 70–99)
HCO3 ARTERIAL: 19.4 MMOL/L (ref 21–29)
MAGNESIUM: 1.7 MG/DL (ref 1.8–2.4)
MAGNESIUM: 1.8 MG/DL (ref 1.8–2.4)
O2 SAT, ARTERIAL: 95 % (ref 93–100)
PCO2 ARTERIAL: 44.2 MM HG (ref 35–45)
PERFORMED ON: ABNORMAL
PH ARTERIAL: 7.25 (ref 7.35–7.45)
PH VENOUS: 7.24 (ref 7.35–7.45)
PH VENOUS: 7.26 (ref 7.35–7.45)
PH VENOUS: 7.27 (ref 7.35–7.45)
PHOSPHORUS: 5.7 MG/DL (ref 2.5–4.9)
PHOSPHORUS: 6 MG/DL (ref 2.5–4.9)
PHOSPHORUS: 6.2 MG/DL (ref 2.5–4.9)
PHOSPHORUS: 6.7 MG/DL (ref 2.5–4.9)
PHOSPHORUS: 7 MG/DL (ref 2.5–4.9)
PO2 ARTERIAL: 89.1 MM HG (ref 75–108)
POC SAMPLE TYPE: ABNORMAL
POTASSIUM REFLEX MAGNESIUM: 4.8 MMOL/L (ref 3.5–5.1)
POTASSIUM SERPL-SCNC: 4.5 MMOL/L (ref 3.5–5.1)
POTASSIUM SERPL-SCNC: 4.6 MMOL/L (ref 3.5–5.1)
POTASSIUM SERPL-SCNC: 4.6 MMOL/L (ref 3.5–5.1)
POTASSIUM SERPL-SCNC: 4.7 MMOL/L (ref 3.5–5.1)
SODIUM BLD-SCNC: 138 MMOL/L (ref 136–145)
SODIUM BLD-SCNC: 138 MMOL/L (ref 136–145)
SODIUM BLD-SCNC: 139 MMOL/L (ref 136–145)
SODIUM BLD-SCNC: 139 MMOL/L (ref 136–145)
SODIUM BLD-SCNC: 141 MMOL/L (ref 136–145)
TCO2 ARTERIAL: 21 MMOL/L
TOTAL PROTEIN: 4.9 G/DL (ref 6.4–8.2)

## 2019-07-12 PROCEDURE — 2580000003 HC RX 258: Performed by: INTERNAL MEDICINE

## 2019-07-12 PROCEDURE — 6370000000 HC RX 637 (ALT 250 FOR IP): Performed by: INTERNAL MEDICINE

## 2019-07-12 PROCEDURE — 2000000000 HC ICU R&B

## 2019-07-12 PROCEDURE — 95822 EEG COMA OR SLEEP ONLY: CPT | Performed by: PSYCHIATRY & NEUROLOGY

## 2019-07-12 PROCEDURE — 71045 X-RAY EXAM CHEST 1 VIEW: CPT

## 2019-07-12 PROCEDURE — 6360000002 HC RX W HCPCS: Performed by: INTERNAL MEDICINE

## 2019-07-12 PROCEDURE — 94770 HC ETCO2 MONITOR DAILY: CPT

## 2019-07-12 PROCEDURE — 95819 EEG AWAKE AND ASLEEP: CPT

## 2019-07-12 PROCEDURE — 83735 ASSAY OF MAGNESIUM: CPT

## 2019-07-12 PROCEDURE — 93010 ELECTROCARDIOGRAM REPORT: CPT | Performed by: INTERNAL MEDICINE

## 2019-07-12 PROCEDURE — 2500000003 HC RX 250 WO HCPCS: Performed by: INTERNAL MEDICINE

## 2019-07-12 PROCEDURE — 2580000003 HC RX 258: Performed by: EMERGENCY MEDICINE

## 2019-07-12 PROCEDURE — 94761 N-INVAS EAR/PLS OXIMETRY MLT: CPT

## 2019-07-12 PROCEDURE — 99223 1ST HOSP IP/OBS HIGH 75: CPT | Performed by: PSYCHIATRY & NEUROLOGY

## 2019-07-12 PROCEDURE — 84100 ASSAY OF PHOSPHORUS: CPT

## 2019-07-12 PROCEDURE — 82330 ASSAY OF CALCIUM: CPT

## 2019-07-12 PROCEDURE — 99291 CRITICAL CARE FIRST HOUR: CPT | Performed by: INTERNAL MEDICINE

## 2019-07-12 PROCEDURE — 2100000000 HC CCU R&B

## 2019-07-12 PROCEDURE — 94750 HC PULMONARY COMPLIANCE STUDY: CPT

## 2019-07-12 PROCEDURE — 85730 THROMBOPLASTIN TIME PARTIAL: CPT

## 2019-07-12 PROCEDURE — 94003 VENT MGMT INPAT SUBQ DAY: CPT

## 2019-07-12 PROCEDURE — 82803 BLOOD GASES ANY COMBINATION: CPT

## 2019-07-12 PROCEDURE — 99292 CRITICAL CARE ADDL 30 MIN: CPT | Performed by: INTERNAL MEDICINE

## 2019-07-12 PROCEDURE — 2700000000 HC OXYGEN THERAPY PER DAY

## 2019-07-12 PROCEDURE — C9113 INJ PANTOPRAZOLE SODIUM, VIA: HCPCS | Performed by: INTERNAL MEDICINE

## 2019-07-12 PROCEDURE — 6360000002 HC RX W HCPCS: Performed by: EMERGENCY MEDICINE

## 2019-07-12 PROCEDURE — 82947 ASSAY GLUCOSE BLOOD QUANT: CPT

## 2019-07-12 PROCEDURE — 80053 COMPREHEN METABOLIC PANEL: CPT

## 2019-07-12 RX ORDER — SODIUM CHLORIDE 9 MG/ML
INJECTION, SOLUTION INTRAVENOUS
Status: DISPENSED
Start: 2019-07-12 | End: 2019-07-12

## 2019-07-12 RX ORDER — MAGNESIUM SULFATE 1 G/100ML
1 INJECTION INTRAVENOUS ONCE
Status: COMPLETED | OUTPATIENT
Start: 2019-07-12 | End: 2019-07-12

## 2019-07-12 RX ADMIN — CARBOXYMETHYLCELLULOSE SODIUM 1 DROP: 10 GEL OPHTHALMIC at 10:20

## 2019-07-12 RX ADMIN — CARBOXYMETHYLCELLULOSE SODIUM 1 DROP: 10 GEL OPHTHALMIC at 14:00

## 2019-07-12 RX ADMIN — ASPIRIN 81 MG 81 MG: 81 TABLET ORAL at 10:13

## 2019-07-12 RX ADMIN — AMIODARONE HYDROCHLORIDE 0.5 MG/MIN: 50 INJECTION, SOLUTION INTRAVENOUS at 09:08

## 2019-07-12 RX ADMIN — CALCIUM GLUCONATE 2 G: 98 INJECTION, SOLUTION INTRAVENOUS at 04:31

## 2019-07-12 RX ADMIN — CARBOXYMETHYLCELLULOSE SODIUM 1 DROP: 10 GEL OPHTHALMIC at 22:34

## 2019-07-12 RX ADMIN — Medication 15 ML: at 10:12

## 2019-07-12 RX ADMIN — Medication 10 ML: at 22:35

## 2019-07-12 RX ADMIN — MUPIROCIN: 20 OINTMENT TOPICAL at 10:12

## 2019-07-12 RX ADMIN — Medication 15 ML: at 22:34

## 2019-07-12 RX ADMIN — VASOPRESSIN 0.04 UNITS/MIN: 20 INJECTION INTRAVENOUS at 10:12

## 2019-07-12 RX ADMIN — ATORVASTATIN CALCIUM 40 MG: 40 TABLET, FILM COATED ORAL at 10:13

## 2019-07-12 RX ADMIN — SODIUM CHLORIDE: 9 INJECTION, SOLUTION INTRAVENOUS at 00:41

## 2019-07-12 RX ADMIN — MAGNESIUM SULFATE HEPTAHYDRATE 1 G: 1 INJECTION, SOLUTION INTRAVENOUS at 10:13

## 2019-07-12 RX ADMIN — HEPARIN SODIUM AND DEXTROSE 6.8 ML/HR: 10000; 5 INJECTION INTRAVENOUS at 04:33

## 2019-07-12 RX ADMIN — NOREPINEPHRINE BITARTRATE 15 MCG/MIN: 1 INJECTION INTRAVENOUS at 09:10

## 2019-07-12 RX ADMIN — PANTOPRAZOLE SODIUM 40 MG: 40 INJECTION, POWDER, FOR SOLUTION INTRAVENOUS at 10:12

## 2019-07-12 RX ADMIN — VASOPRESSIN 0.04 UNITS/MIN: 20 INJECTION INTRAVENOUS at 02:42

## 2019-07-12 RX ADMIN — Medication 10 ML: at 10:12

## 2019-07-12 RX ADMIN — FOLIC ACID: 5 INJECTION, SOLUTION INTRAMUSCULAR; INTRAVENOUS; SUBCUTANEOUS at 10:12

## 2019-07-12 RX ADMIN — MUPIROCIN: 20 OINTMENT TOPICAL at 22:34

## 2019-07-12 ASSESSMENT — PULMONARY FUNCTION TESTS
PIF_VALUE: 20
PIF_VALUE: 19
PIF_VALUE: 20
PIF_VALUE: 25
PIF_VALUE: 19
PIF_VALUE: 27
PIF_VALUE: 18
PIF_VALUE: 19
PIF_VALUE: 32
PIF_VALUE: 16
PIF_VALUE: 21
PIF_VALUE: 18
PIF_VALUE: 24
PIF_VALUE: 19
PIF_VALUE: 19
PIF_VALUE: 22
PIF_VALUE: 28
PIF_VALUE: 19
PIF_VALUE: 20
PIF_VALUE: 19
PIF_VALUE: 18
PIF_VALUE: 22
PIF_VALUE: 25
PIF_VALUE: 26
PIF_VALUE: 19
PIF_VALUE: 20
PIF_VALUE: 18
PIF_VALUE: 19
PIF_VALUE: 23
PIF_VALUE: 22
PIF_VALUE: 20
PIF_VALUE: 17
PIF_VALUE: 26
PIF_VALUE: 21
PIF_VALUE: 18
PIF_VALUE: 19
PIF_VALUE: 17
PIF_VALUE: 20
PIF_VALUE: 20
PIF_VALUE: 45
PIF_VALUE: 21
PIF_VALUE: 19

## 2019-07-12 NOTE — PROGRESS NOTES
07/12/19 0004   Vent Information   $Ventilation $Subsequent Day   Vent Type 840   Vent Mode AC/VC   Vt Ordered 500 mL   Rate Set 20 bmp   Peak Flow 45 L/min   Pressure Support 0 cmH20   FiO2  55 %   Sensitivity 3   PEEP/CPAP 5   Cuff Pressure (cm H2O) 28 cm H2O   Humidification Source Heated wire   Humidification Temp 36.9   Circuit Condensation Drained   Vent Patient Data   Peak Inspiratory Pressure 20 cmH2O   Mean Airway Pressure 11 cmH20   Rate Measured 20 br/min   Vt Exhaled 526 mL   Minute Volume 9.94 Liters   I:E Ratio 1:1.50   Cough/Sputum   Sputum How Obtained Endotracheal;Suctioned   Cough Productive   Sputum Amount Large   Sputum Color Dark Yellow   Tenacity Thick   Spontaneous Breathing Trial (SBT) RT Doc   Pulse 70   SpO2 98 %   Breath Sounds   Right Upper Lobe Rhonchi   Right Middle Lobe Rhonchi   Right Lower Lobe Rhonchi   Left Upper Lobe Rhonchi   Left Lower Lobe Rhonchi   Additional Respiratory  Assessments   Resp 20   End Tidal CO2 26 (%)   Alarm Settings   High Pressure Alarm 45 cmH2O   Low Minute Volume Alarm 3 L/min   High Respiratory Rate 40 br/min   Low Exhaled Vt  300 mL   ETT (adult)   Placement Date/Time: 07/09/19 2140   Preoxygenation: Yes  Mask Ventilation: Ventilated by mask with oral airway (2)  Technique: Direct laryngoscopy;Rapid sequence;Stylet; Video laryngoscopy  Type: Cuffed  Tube Size: 8 mm  Laryngoscope: GlideScope  Ray. ..    Secured at 24 cm   Measured From Lips   ET Placement Right   Secured By Commercial tube petty   Site Condition Dry

## 2019-07-12 NOTE — PROGRESS NOTES
07/12/19 1603   Vent Information   Vt Ordered 500 mL   Rate Set 20 bmp   Peak Flow 45 L/min   Pressure Support 0 cmH20   FiO2  45 %   Sensitivity 3   PEEP/CPAP 5   Cuff Pressure (cm H2O) 30 cm H2O   Humidification Source Heated wire   Humidification Temp 36.7   Humidification Temp Measured 36.7   Circuit Condensation Drained   Vent Patient Data   Peak Inspiratory Pressure 19 cmH2O   Mean Airway Pressure 11 cmH20   Rate Measured 21 br/min   Vt Exhaled 527 mL   Minute Volume 11.2 Liters   I:E Ratio 1:1.50   Plateau Pressure 16 [id number]   Static Compliance 48 mL/cmH2O   Dynamic Compliance 36.5 mL/cmH2O   Cough/Sputum   Sputum How Obtained Suctioned;Endotracheal   Cough Productive   Frequency Occasional   Sputum Amount Moderate   Sputum Color Creamy; Tan   Tenacity Thick   Spontaneous Breathing Trial (SBT) RT Doc   Pulse 70   Breath Sounds   Right Upper Lobe Diminished   Right Middle Lobe Diminished   Right Lower Lobe Diminished   Left Upper Lobe Diminished   Left Lower Lobe Diminished   Additional Respiratory  Assessments   Resp 17   End Tidal CO2 24 (%)   Position Semi-Stuart's   Alarm Settings   High Pressure Alarm 45 cmH2O   Low Minute Volume Alarm 3 L/min   Apnea (secs) 20 secs   High Respiratory Rate 40 br/min   Low Exhaled Vt  300 mL   ETT (adult)   Placement Date/Time: 07/09/19 2140   Preoxygenation: Yes  Mask Ventilation: Ventilated by mask with oral airway (2)  Technique: Direct laryngoscopy;Rapid sequence;Stylet; Video laryngoscopy  Type: Cuffed  Tube Size: 8 mm  Laryngoscope: GlideScope  Ray. ..    Secured at 24 cm   Measured From [address] [place] [address],[address] By Commercial tube petty   Site Condition Dry   Cuff Pressure 30 cm H2O

## 2019-07-12 NOTE — PROCEDURES
7/12/2019     Patient: Germain Edge    MR Number: [id number]  YOB: 1945  Date of Visit: 7/12/2019    Clinical History:    The patient is a 66y.o. years old male with acute anoxic injury and possible new onset seizure. Method: The EEG was performed utilizing the international 10/20 of electrode placements of both referential and bipolar montages. The patient is in coma through out the recording. Findings: The background of the EEG showed generalized diffuse slowing through out the recording in the 4-6 HZ mixed with faster frequency which is symmetric, non rhythmical, and continuous. No spike or sharp waves were seen. No EEG seizures or periodic pattern. Impression: This EEG is abnormal. The generalized diffuse slowing is suggestive of moderate diffuse encephalopathy. There is no evidence of epileptiform discharges, focal, or lateralizing abnormalities.         Estefania Moreira MD      Board certified in clinical neurophysiology

## 2019-07-12 NOTE — PROGRESS NOTES
Discussed current hemodynamics and vasoactive infusions with NIHARIKA Irene. Verbal order received to discontinue Vasopressin infusion and restart levophed if needed to maintain MAP > 65.

## 2019-07-12 NOTE — PROGRESS NOTES
07/11/19 2010   Vent Information   Vent Type 840   Vent Mode AC/VC   Vt Ordered 500 mL   Rate Set 20 bmp   Peak Flow 45 L/min   Pressure Support 0 cmH20   FiO2  60 %   Sensitivity 3   PEEP/CPAP 5   Cuff Pressure (cm H2O) 30 cm H2O   Humidification Source Heated wire   Humidification Temp Measured 36.1   Circuit Condensation Drained   Vent Patient Data   Peak Inspiratory Pressure 24 cmH2O   Mean Airway Pressure 13 cmH20   Rate Measured 20 br/min   Vt Exhaled 470 mL   Minute Volume 9.32 Liters   I:E Ratio 1:1.50   Plateau Pressure 19 [id number]   Static Compliance 33.57 mL/cmH2O   Dynamic Compliance 24.74 mL/cmH2O   Cough/Sputum   Sputum How Obtained Endotracheal   Sputum Amount Small   Sputum Color Cloudy   Tenacity Thin   Spontaneous Breathing Trial (SBT) RT Doc   Pulse 69   SpO2 93 %   Breath Sounds   Right Upper Lobe Clear   Right Middle Lobe Diminished   Right Lower Lobe Clear   Left Upper Lobe Clear   Left Lower Lobe Diminished   Additional Respiratory  Assessments   Resp 20   End Tidal CO2 29 (%)   Position Semi-Stuart's   Alarm Settings   High Pressure Alarm 45 cmH2O   Low Minute Volume Alarm 3 L/min   High Respiratory Rate 40 br/min   Patient Observation   Observations ambu at bedside   ETT (adult)   Placement Date/Time: 07/09/19 2140   Preoxygenation: Yes  Mask Ventilation: Ventilated by mask with oral airway (2)  Technique: Direct laryngoscopy;Rapid sequence;Stylet; Video laryngoscopy  Type: Cuffed  Tube Size: 8 mm  Laryngoscope: GlideScope  Ray. ..    Secured at 24 cm   Measured From Lips   ET Placement Left   Secured By Commercial tube petty   Site Condition Dry   Cuff Pressure 30 cm H2O

## 2019-07-12 NOTE — PROGRESS NOTES
07/12/19 0346   Vent Information   Vent Type 840   Vent Mode AC/VC   Vt Ordered 500 mL   Rate Set 20 bmp   Peak Flow 45 L/min   Pressure Support 0 cmH20   FiO2  55 %   Sensitivity 3   PEEP/CPAP 5   Cuff Pressure (cm H2O) 30 cm H2O   Humidification Source Heated wire   Humidification Temp 37   Humidification Temp Measured 36.8   Vent Patient Data   Peak Inspiratory Pressure 20 cmH2O   Mean Airway Pressure 11 cmH20   Rate Measured 20 br/min   Vt Exhaled 532 mL   Minute Volume 10.6 Liters   I:E Ratio 1:1.50   Cough/Sputum   Sputum How Obtained Endotracheal   Cough Productive   Sputum Amount Moderate   Sputum Color Cloudy; Yellow   Tenacity Thick   Spontaneous Breathing Trial (SBT) RT Doc   Pulse 70   SpO2 100 %   Breath Sounds   Right Upper Lobe Rhonchi   Right Middle Lobe Rhonchi   Right Lower Lobe Rhonchi   Left Upper Lobe Rhonchi   Left Lower Lobe Rhonchi   Additional Respiratory  Assessments   Resp 20   Position Semi-Stuart's   Alarm Settings   High Pressure Alarm 45 cmH2O   Low Minute Volume Alarm 3 L/min   High Respiratory Rate 40 br/min   Low Exhaled Vt  300 mL   ETT (adult)   Placement Date/Time: 07/09/19 2140   Preoxygenation: Yes  Mask Ventilation: Ventilated by mask with oral airway (2)  Technique: Direct laryngoscopy;Rapid sequence;Stylet; Video laryngoscopy  Type: Cuffed  Tube Size: 8 mm  Laryngoscope: GlideScope  Ray. ..    Secured at 24 cm   Measured From Lips   ET Placement Left   Secured By Commercial tube petty   Site Condition Dry

## 2019-07-12 NOTE — PROGRESS NOTES
Hospitalist Progress Note    Date of Admission: 7/9/2019    Chief Complaint: Cardiac arrest    Hospital Course: 65 yo M presented with witnessed arrest at home. His family started CPR and EMS arrived quickly. He was shocked for VT x 3 at home with ROSC. He was shocked another time on route to ED. In ED he was intubated for GCS of 4. Cardiology took him for Angiogram which showed multi-vessel disease but no definite culprit lesion. Started on hypothermia protocol. Subjective:   Off hypothermia protocol  Intubated, lightly sedated; showing some peripheral movement but nothing purposeful. Mild vasopressor support  Improving O2 requirement on vent. Labs:   Recent Labs     07/11/19  0510 07/11/19  1250 07/11/19  1725   WBC 14.7* 13.1* 15.8*   HGB 14.7 13.9 13.8   HCT 42.8 40.8 39.9*    140 152     Recent Labs     07/12/19  0240 07/12/19  0435 07/12/19  0626    139 139   K 4.5 4.6 4.8    110 111*   CO2 19* 19* 18*   BUN 35* 34* 35*   CREATININE 1.5* 1.4* 1.4*   CALCIUM 7.9* 8.0* 8.1*   PHOS 6.7* 6.2* 6.0*     Recent Labs     07/10/19  0343 07/11/19  0510 07/12/19  0626   * 100* 89*   ALT 89* 62* 90*   BILITOT 0.9 0.5 0.3   ALKPHOS 79 66 86     Recent Labs     07/09/19  2130 07/10/19  0343   INR 1.04 1.18*       Physical Exam Performed:    /71   Pulse 70   Temp 98.1 °F (36.7 °C) (Rectal)   Resp 20   Ht 5' 10\" (1.778 m)   Wt 176 lb 9.4 oz (80.1 kg)   SpO2 100%   BMI 25.34 kg/m²   General appearance: Intubated, sedated, no acute distress  Eyes: Conjunctivae normal, sclera non-icteric. PERRL. HENT: Oropharynx clear, mucous membranes moist.  Respiratory: On vent. Limited examination, grossly clear to auscultation bilaterally without wheezes, crackles or rhonchi. Cardiovascular: Regular rate and rhythm, normal S1/S2, no murmurs. No JVD. No significant LE edema. Abdomen: Soft, non-tender; non-distended, normal bowel sounds.    Neuro: Limited exam  Psychiatric:

## 2019-07-12 NOTE — PROGRESS NOTES
50mL versed wasted with Jasson Bhakta RN. 50mL versed wasted with Mitch Aguirre RN.      Electronically signed by Stuart Walden RN on 7/12/2019 at 10:40 AM

## 2019-07-12 NOTE — PROGRESS NOTES
07/12/19 1202   Vent Information   Vent Type 840   Vent Mode AC/VC   Vt Ordered 500 mL   Rate Set 20 bmp   Peak Flow 45 L/min   Pressure Support 0 cmH20   FiO2  45 %   Sensitivity 3   PEEP/CPAP 5   Cuff Pressure (cm H2O) 30 cm H2O   Humidification Source Heated wire   Humidification Temp 36.5   Humidification Temp Measured 36.5   Circuit Condensation Drained   Vent Patient Data   Peak Inspiratory Pressure 22 cmH2O   Mean Airway Pressure 11 cmH20   Rate Measured 20 br/min   Vt Exhaled 463 mL   Minute Volume 10.9 Liters   I:E Ratio 1:1.50   Cough/Sputum   Sputum How Obtained Suctioned;Endotracheal   Cough Productive   Frequency Occasional   Sputum Amount Small   Sputum Color Creamy; Tan   Tenacity Thick   Spontaneous Breathing Trial (SBT) RT Doc   Pulse 70   SpO2 98 %   Breath Sounds   Right Upper Lobe Rhonchi   Right Middle Lobe Rhonchi   Right Lower Lobe Diminished   Left Upper Lobe Rhonchi   Left Lower Lobe Diminished   Additional Respiratory  Assessments   Resp 16   End Tidal CO2 24 (%)   Position Semi-Stuart's   Alarm Settings   High Pressure Alarm 45 cmH2O   Low Minute Volume Alarm 3 L/min   Apnea (secs) 20 secs   High Respiratory Rate 40 br/min   Low Exhaled Vt  300 mL   ETT (adult)   Placement Date/Time: 07/09/19 2140   Preoxygenation: Yes  Mask Ventilation: Ventilated by mask with oral airway (2)  Technique: Direct laryngoscopy;Rapid sequence;Stylet; Video laryngoscopy  Type: Cuffed  Tube Size: 8 mm  Laryngoscope: GlideScope  Ray. ..    Secured at 25 cm   Measured From [address] [place] [address],[address] By Commercial tube petty   Site Condition Dry   Cuff Pressure 30 cm H2O

## 2019-07-13 ENCOUNTER — APPOINTMENT (OUTPATIENT)
Dept: GENERAL RADIOLOGY | Age: 74
DRG: 286 | End: 2019-07-13
Payer: MEDICARE

## 2019-07-13 LAB
A/G RATIO: 1 (ref 1.1–2.2)
ALBUMIN SERPL-MCNC: 2.4 G/DL (ref 3.4–5)
ALP BLD-CCNC: 92 U/L (ref 40–129)
ALT SERPL-CCNC: 69 U/L (ref 10–40)
ANION GAP SERPL CALCULATED.3IONS-SCNC: 7 MMOL/L (ref 3–16)
ANION GAP SERPL CALCULATED.3IONS-SCNC: 9 MMOL/L (ref 3–16)
APTT: 37 SEC (ref 26–36)
APTT: 40.5 SEC (ref 26–36)
APTT: 45.7 SEC (ref 26–36)
AST SERPL-CCNC: 58 U/L (ref 15–37)
BASE EXCESS ARTERIAL: -3 (ref -3–3)
BASE EXCESS ARTERIAL: -3.2 MMOL/L (ref -3–3)
BASE EXCESS ARTERIAL: -5 (ref -3–3)
BILIRUB SERPL-MCNC: 0.6 MG/DL (ref 0–1)
BUN BLDV-MCNC: 29 MG/DL (ref 7–20)
BUN BLDV-MCNC: 32 MG/DL (ref 7–20)
CALCIUM IONIZED: 1.12 MMOL/L (ref 1.12–1.32)
CALCIUM SERPL-MCNC: 8.1 MG/DL (ref 8.3–10.6)
CALCIUM SERPL-MCNC: 8.5 MG/DL (ref 8.3–10.6)
CARBOXYHEMOGLOBIN ARTERIAL: 0.5 % (ref 0–1.5)
CHLORIDE BLD-SCNC: 103 MMOL/L (ref 99–110)
CHLORIDE BLD-SCNC: 110 MMOL/L (ref 99–110)
CO2: 22 MMOL/L (ref 21–32)
CO2: 25 MMOL/L (ref 21–32)
CREAT SERPL-MCNC: 0.8 MG/DL (ref 0.8–1.3)
CREAT SERPL-MCNC: 0.8 MG/DL (ref 0.8–1.3)
GFR AFRICAN AMERICAN: >60
GFR AFRICAN AMERICAN: >60
GFR NON-AFRICAN AMERICAN: >60
GFR NON-AFRICAN AMERICAN: >60
GLOBULIN: 2.4 G/DL
GLUCOSE BLD-MCNC: 107 MG/DL (ref 70–99)
GLUCOSE BLD-MCNC: 112 MG/DL (ref 70–99)
GLUCOSE BLD-MCNC: 123 MG/DL (ref 70–99)
GLUCOSE BLD-MCNC: 127 MG/DL (ref 70–99)
HCO3 ARTERIAL: 20.9 MMOL/L (ref 21–29)
HCO3 ARTERIAL: 21 MMOL/L (ref 21–29)
HCO3 ARTERIAL: 21.6 MMOL/L (ref 21–29)
HEMOGLOBIN, ART, EXTENDED: 13 G/DL (ref 13.5–17.5)
MAGNESIUM: 2 MG/DL (ref 1.8–2.4)
METHEMOGLOBIN ARTERIAL: 0.6 %
O2 CONTENT ARTERIAL: 18 ML/DL
O2 SAT, ARTERIAL: 91 % (ref 93–100)
O2 SAT, ARTERIAL: 96 % (ref 93–100)
O2 SAT, ARTERIAL: 97.5 %
O2 THERAPY: ABNORMAL
PCO2 ARTERIAL: 32.7 MM HG (ref 35–45)
PCO2 ARTERIAL: 34.7 MMHG (ref 35–45)
PCO2 ARTERIAL: 36.6 MM HG (ref 35–45)
PERFORMED ON: ABNORMAL
PERFORMED ON: ABNORMAL
PH ARTERIAL: 7.37 (ref 7.35–7.45)
PH ARTERIAL: 7.4 (ref 7.35–7.45)
PH ARTERIAL: 7.43 (ref 7.35–7.45)
PH VENOUS: 7.42 (ref 7.35–7.45)
PHOSPHORUS: 2.5 MG/DL (ref 2.5–4.9)
PO2 ARTERIAL: 61.3 MM HG (ref 75–108)
PO2 ARTERIAL: 78.8 MM HG (ref 75–108)
PO2 ARTERIAL: 99.6 MMHG (ref 75–108)
POC SAMPLE TYPE: ABNORMAL
POC SAMPLE TYPE: ABNORMAL
POTASSIUM REFLEX MAGNESIUM: 3.7 MMOL/L (ref 3.5–5.1)
POTASSIUM SERPL-SCNC: 3.4 MMOL/L (ref 3.5–5.1)
POTASSIUM SERPL-SCNC: 3.7 MMOL/L (ref 3.5–5.1)
SODIUM BLD-SCNC: 137 MMOL/L (ref 136–145)
SODIUM BLD-SCNC: 139 MMOL/L (ref 136–145)
TCO2 ARTERIAL: 22 MMOL/L
TCO2 ARTERIAL: 22 MMOL/L
TCO2 ARTERIAL: 23 MMOL/L
TOTAL PROTEIN: 4.8 G/DL (ref 6.4–8.2)

## 2019-07-13 PROCEDURE — 6360000002 HC RX W HCPCS

## 2019-07-13 PROCEDURE — 94761 N-INVAS EAR/PLS OXIMETRY MLT: CPT

## 2019-07-13 PROCEDURE — 80053 COMPREHEN METABOLIC PANEL: CPT

## 2019-07-13 PROCEDURE — 99292 CRITICAL CARE ADDL 30 MIN: CPT | Performed by: INTERNAL MEDICINE

## 2019-07-13 PROCEDURE — 99233 SBSQ HOSP IP/OBS HIGH 50: CPT | Performed by: PSYCHIATRY & NEUROLOGY

## 2019-07-13 PROCEDURE — 85730 THROMBOPLASTIN TIME PARTIAL: CPT

## 2019-07-13 PROCEDURE — 99291 CRITICAL CARE FIRST HOUR: CPT | Performed by: INTERNAL MEDICINE

## 2019-07-13 PROCEDURE — 2100000000 HC CCU R&B

## 2019-07-13 PROCEDURE — 94660 CPAP INITIATION&MGMT: CPT

## 2019-07-13 PROCEDURE — C9113 INJ PANTOPRAZOLE SODIUM, VIA: HCPCS | Performed by: INTERNAL MEDICINE

## 2019-07-13 PROCEDURE — 94750 HC PULMONARY COMPLIANCE STUDY: CPT

## 2019-07-13 PROCEDURE — 2580000003 HC RX 258: Performed by: EMERGENCY MEDICINE

## 2019-07-13 PROCEDURE — 94003 VENT MGMT INPAT SUBQ DAY: CPT

## 2019-07-13 PROCEDURE — 83735 ASSAY OF MAGNESIUM: CPT

## 2019-07-13 PROCEDURE — 94770 HC ETCO2 MONITOR DAILY: CPT

## 2019-07-13 PROCEDURE — 6360000002 HC RX W HCPCS: Performed by: INTERNAL MEDICINE

## 2019-07-13 PROCEDURE — 71045 X-RAY EXAM CHEST 1 VIEW: CPT

## 2019-07-13 PROCEDURE — 2700000000 HC OXYGEN THERAPY PER DAY

## 2019-07-13 PROCEDURE — 2580000003 HC RX 258: Performed by: INTERNAL MEDICINE

## 2019-07-13 PROCEDURE — 6370000000 HC RX 637 (ALT 250 FOR IP): Performed by: INTERNAL MEDICINE

## 2019-07-13 PROCEDURE — 84100 ASSAY OF PHOSPHORUS: CPT

## 2019-07-13 PROCEDURE — 82330 ASSAY OF CALCIUM: CPT

## 2019-07-13 PROCEDURE — 6360000002 HC RX W HCPCS: Performed by: EMERGENCY MEDICINE

## 2019-07-13 PROCEDURE — 2500000003 HC RX 250 WO HCPCS: Performed by: INTERNAL MEDICINE

## 2019-07-13 PROCEDURE — 82803 BLOOD GASES ANY COMBINATION: CPT

## 2019-07-13 PROCEDURE — 2000000000 HC ICU R&B

## 2019-07-13 PROCEDURE — 82947 ASSAY GLUCOSE BLOOD QUANT: CPT

## 2019-07-13 RX ORDER — FUROSEMIDE 10 MG/ML
40 INJECTION INTRAMUSCULAR; INTRAVENOUS ONCE
Status: COMPLETED | OUTPATIENT
Start: 2019-07-13 | End: 2019-07-13

## 2019-07-13 RX ORDER — LORAZEPAM 2 MG/ML
1 INJECTION INTRAMUSCULAR EVERY 6 HOURS PRN
Status: DISCONTINUED | OUTPATIENT
Start: 2019-07-13 | End: 2019-07-16 | Stop reason: HOSPADM

## 2019-07-13 RX ORDER — HALOPERIDOL 5 MG/ML
1 INJECTION INTRAMUSCULAR EVERY 4 HOURS PRN
Status: DISCONTINUED | OUTPATIENT
Start: 2019-07-13 | End: 2019-07-16 | Stop reason: HOSPADM

## 2019-07-13 RX ORDER — LORAZEPAM 2 MG/ML
INJECTION INTRAMUSCULAR
Status: COMPLETED
Start: 2019-07-13 | End: 2019-07-13

## 2019-07-13 RX ORDER — HALOPERIDOL 5 MG/ML
5 INJECTION INTRAMUSCULAR EVERY 4 HOURS PRN
Status: DISCONTINUED | OUTPATIENT
Start: 2019-07-13 | End: 2019-07-13

## 2019-07-13 RX ORDER — CHLOROTHIAZIDE SODIUM 500 MG/1
500 INJECTION INTRAVENOUS ONCE
Status: COMPLETED | OUTPATIENT
Start: 2019-07-13 | End: 2019-07-13

## 2019-07-13 RX ORDER — HEPARIN SODIUM 1000 [USP'U]/ML
2000 INJECTION, SOLUTION INTRAVENOUS; SUBCUTANEOUS ONCE
Status: COMPLETED | OUTPATIENT
Start: 2019-07-13 | End: 2019-07-13

## 2019-07-13 RX ADMIN — MUPIROCIN: 20 OINTMENT TOPICAL at 20:18

## 2019-07-13 RX ADMIN — AMIODARONE HYDROCHLORIDE 0.5 MG/MIN: 50 INJECTION, SOLUTION INTRAVENOUS at 21:50

## 2019-07-13 RX ADMIN — PANTOPRAZOLE SODIUM 40 MG: 40 INJECTION, POWDER, FOR SOLUTION INTRAVENOUS at 08:24

## 2019-07-13 RX ADMIN — FUROSEMIDE 40 MG: 10 INJECTION, SOLUTION INTRAMUSCULAR; INTRAVENOUS at 10:36

## 2019-07-13 RX ADMIN — Medication 10 ML: at 20:19

## 2019-07-13 RX ADMIN — CHLOROTHIAZIDE SODIUM 500 MG: 500 INJECTION, POWDER, LYOPHILIZED, FOR SOLUTION INTRAVENOUS at 17:34

## 2019-07-13 RX ADMIN — CHLOROTHIAZIDE SODIUM 500 MG: 500 INJECTION, POWDER, LYOPHILIZED, FOR SOLUTION INTRAVENOUS at 11:30

## 2019-07-13 RX ADMIN — CARBOXYMETHYLCELLULOSE SODIUM 1 DROP: 10 GEL OPHTHALMIC at 20:18

## 2019-07-13 RX ADMIN — LORAZEPAM 1 MG: 2 INJECTION, SOLUTION INTRAMUSCULAR; INTRAVENOUS at 10:29

## 2019-07-13 RX ADMIN — DEXMEDETOMIDINE HYDROCHLORIDE 1.4 MCG/KG/HR: 100 INJECTION, SOLUTION INTRAVENOUS at 23:11

## 2019-07-13 RX ADMIN — Medication 10 ML: at 08:25

## 2019-07-13 RX ADMIN — FOLIC ACID: 5 INJECTION, SOLUTION INTRAMUSCULAR; INTRAVENOUS; SUBCUTANEOUS at 09:10

## 2019-07-13 RX ADMIN — DEXMEDETOMIDINE HYDROCHLORIDE 0.4 MCG/KG/HR: 100 INJECTION, SOLUTION INTRAVENOUS at 10:32

## 2019-07-13 RX ADMIN — HEPARIN SODIUM 2000 UNITS: 1000 INJECTION, SOLUTION INTRAVENOUS; SUBCUTANEOUS at 22:15

## 2019-07-13 RX ADMIN — FUROSEMIDE 40 MG: 10 INJECTION, SOLUTION INTRAMUSCULAR; INTRAVENOUS at 16:53

## 2019-07-13 RX ADMIN — ATORVASTATIN CALCIUM 40 MG: 40 TABLET, FILM COATED ORAL at 08:23

## 2019-07-13 RX ADMIN — MUPIROCIN: 20 OINTMENT TOPICAL at 08:26

## 2019-07-13 RX ADMIN — DEXMEDETOMIDINE HYDROCHLORIDE 1.6 MCG/KG/HR: 100 INJECTION, SOLUTION INTRAVENOUS at 14:38

## 2019-07-13 RX ADMIN — HEPARIN SODIUM AND DEXTROSE 8.3 ML/HR: 10000; 5 INJECTION INTRAVENOUS at 12:09

## 2019-07-13 RX ADMIN — AMIODARONE HYDROCHLORIDE 0.5 MG/MIN: 50 INJECTION, SOLUTION INTRAVENOUS at 03:41

## 2019-07-13 RX ADMIN — Medication 15 ML: at 20:18

## 2019-07-13 RX ADMIN — DEXMEDETOMIDINE HYDROCHLORIDE 1.6 MCG/KG/HR: 100 INJECTION, SOLUTION INTRAVENOUS at 18:49

## 2019-07-13 RX ADMIN — CARBOXYMETHYLCELLULOSE SODIUM 1 DROP: 10 GEL OPHTHALMIC at 08:25

## 2019-07-13 RX ADMIN — Medication 15 ML: at 08:26

## 2019-07-13 RX ADMIN — ASPIRIN 81 MG 81 MG: 81 TABLET ORAL at 08:23

## 2019-07-13 ASSESSMENT — PULMONARY FUNCTION TESTS
PIF_VALUE: 20
PIF_VALUE: 19
PIF_VALUE: 12
PIF_VALUE: 12
PIF_VALUE: 18
PIF_VALUE: 23
PIF_VALUE: 20
PIF_VALUE: 21
PIF_VALUE: 19
PIF_VALUE: 15
PIF_VALUE: 22
PIF_VALUE: 18
PIF_VALUE: 19
PIF_VALUE: 27

## 2019-07-13 NOTE — PROGRESS NOTES
Pulmonary & Critical Care Inpatient Progress Note   Milena Diaz MD     REASON FOR TODAY'S VISIT:  Assistance with critical care management    SUBJECTIVE:   Was extubated this AM however has subsequently developed worsening respiratory failure and hypoxia, upgraded to high flow oxygen. Intermittent confusion and trying to crawl out of bed, he was placed on precedex gtt with limited success. Scheduled Meds:   furosemide  40 mg Intravenous Once    folic acid, thiamine, multi-vitamin with vitamin K infusion   Intravenous Daily    chlorhexidine  15 mL Mouth/Throat BID    atorvastatin  40 mg Oral Daily    aspirin  81 mg Oral Daily    sodium chloride flush  10 mL Intravenous 2 times per day    pantoprazole  40 mg Intravenous Daily    carboxymethylcellulose PF  1 drop Both Eyes TID    mupirocin   Nasal BID       Continuous Infusions:   dexmedetomidine (PRECEDEX) IV infusion 1.6 mcg/kg/hr (07/13/19 1438)    norepinephrine (LEVOPHED) infusion 6 mcg/min (07/13/19 0830)    heparin (porcine) 8.3 mL/hr (07/13/19 1209)    amiodarone 0.5 mg/min (07/13/19 0341)    fentaNYL (SUBLIMAZE) infusion Stopped (07/12/19 0917)       PRN Meds:  haloperidol lactate, LORazepam, ondansetron, sodium chloride flush, acetaminophen, acetaminophen, potassium chloride, magnesium sulfate, fentanNYL, heparin (porcine), heparin (porcine)    ALLERGIES:  Patient has No Known Allergies. Objective:   PHYSICAL EXAM:  /73   Pulse 70   Temp 98.1 °F (36.7 °C) (Core)   Resp 13   Ht 5' 10\" (1.778 m)   Wt 176 lb 9.4 oz (80.1 kg)   SpO2 97%   BMI 25.34 kg/m²    Physical Exam   Constitutional: He appears well-developed. No distress. HENT:   Head: Normocephalic and atraumatic. Mouth/Throat: Oropharynx is clear and moist. No oropharyngeal exudate. Neck: Neck supple. No JVD present. Cardiovascular: Normal heart sounds. Exam reveals no gallop and no friction rub. No murmur heard. Pulmonary/Chest: He has no wheezes.  He has no hypoxic   -right sided aspiration pneumonia   -vent dependent due to airway protection/mentation issues  2. Cardiac arrest, VT with ROSC   -s/p targeted cooling protocol   -associated cardiogenic shock   -multivessel CAD  3. Acute encephalopathy, anoxic   -underlying etoh abuse without acute withdrawal  4. MORE   -hyperchloremic metabolic acidosis     Plan:      -Escalate respiratory support with vapotherm, remove NG to assist with mucous clearance  -Added lasix and diuril x2 doses each today. Repeat electrolytes later today to follow. Nephro involved as well  -Continue precedex but will increase max dose, add low dose PRN ativan and haldol   -Serial CXRs and ABGs  -EEG and neuro consultation noted  -Thiamine for DT prophylaxis, monitor for etoh withdrawal   -Cardiology and cardiac surgery following, tentative plan for CABG only if mentation recovers. Due to life threatening respiratory failure, renal failure, hemodynamic instability, and encephalopathy this patient is critically ill. Total critical care time involved in his care today was 90 mins.      Carlo Dempsey MD

## 2019-07-13 NOTE — PROGRESS NOTES
[facility]   Progress Note  Cardiology    CC: cardiac arrest    HPI: continued improvement. No chest pain    Medications/Labs all Reviewed    Lab Results   Component Value Date    WBC 15.8 (H) 07/11/2019    HGB 13.8 07/11/2019    HCT 39.9 (L) 07/11/2019    MCV 92.3 07/11/2019     07/11/2019     Lab Results   Component Value Date    CREATININE 0.8 07/13/2019    BUN 32 (H) 07/13/2019     07/13/2019    K 3.7 07/13/2019    K 3.7 07/13/2019     07/13/2019    CO2 22 07/13/2019     Lab Results   Component Value Date    INR 1.18 (H) 07/10/2019    PROTIME 13.5 (H) 07/10/2019        Physical Examination:    /72   Pulse 68   Temp 99.1 °F (37.3 °C) (Core)   Resp 16   Ht 5' 10\" (1.778 m)   Wt 176 lb 9.4 oz (80.1 kg)   SpO2 95%   BMI 25.34 kg/m²      ET tube in place  Respiratory:  · Resp Assessment: vent  · Resp Auscultation: coarse bs bilaterally bilaterally   Cardiovascular:  · Auscultation: regular rate and rhythm, normal S1S2, no murmur, rub or gallop  · Palpation:  Nl PMI  · JVP:  normal  · Extremities: No Edema  Abdomen:  · Soft, decreased bowel sounds  Extremities:  ·  No Cyanosis or Clubbing  Neurological/Psychiatric:  · Responds to commands. Moves all 4 extremities  Skin:   · warm and dry      Assessment:    Cardiac arrest  - wintnessed. Bystander CPR. Reported responsive in ER post arrest   V tach - stable on amio. stable  Severe CAD involving Cx and RCA. Moderate LAD disease  Severe aortic stenosis  CMP - EF 25%  Pericardial effusion - small  Cardiogenic shock - cont improved  LBBB  Bradycardia - temp pacer in place. stable  Dilated aortic root  Hypotension - still requires pressors but weaning down  Hypokalemia - resolved   H/O HTN  HLD  Smoker  Neuro status improving     Plan  Extubate  Levophed to maintain adequate BP.  Wean off as tolerated   [facility] consult  No BBlk or ACE until BP stable  Amio drip  ASA and stain  Will need CT chest for aorta  As continued improvement

## 2019-07-13 NOTE — PROGRESS NOTES
reactive to light. Conjunctivae/corneas clear. Neck: Supple, with full range of motion. No jugular venous distention. Trachea midline. Respiratory:  Normal respiratory effort. Clear to auscultation, bilaterally without Rales/Wheezes/Rhonchi. Cardiovascular: Regular rate and rhythm with normal S1/S2 without murmurs, rubs or gallops. Abdomen: Soft, non-tender, non-distended with normal bowel sounds. Musculoskeletal: No clubbing, cyanosis or edema bilaterally. Full range of motion without deformity. Skin: Skin color, texture, turgor normal.  No rashes or lesions. Neurologic:  Neurovascularly intact without any focal sensory/motor deficits. Cranial nerves: II-XII intact, grossly non-focal.  Psychiatric: Intubated sedated  Capillary Refill: Brisk,< 3 seconds   Peripheral Pulses: +2 palpable, equal bilaterally       Labs:   Recent Labs     07/11/19  0510 07/11/19  1250 07/11/19  1725   WBC 14.7* 13.1* 15.8*   HGB 14.7 13.9 13.8   HCT 42.8 40.8 39.9*    140 152     Recent Labs     07/12/19  0626 07/12/19  0838 07/13/19  0625    138 139   K 4.8 4.6 3.7  3.7   * 110 110   CO2 18* 19* 22   BUN 35* 34* 32*   CREATININE 1.4* 1.3 0.8   CALCIUM 8.1* 8.1* 8.1*   PHOS 6.0* 5.7* 2.5     Recent Labs     07/11/19  0510 07/12/19  0626 07/13/19  0625   * 89* 58*   ALT 62* 90* 69*   BILITOT 0.5 0.3 0.6   ALKPHOS 66 86 92     No results for input(s): INR in the last 72 hours. Recent Labs     07/10/19  0852   TROPONINI 2.09*       Urinalysis:      Lab Results   Component Value Date    NITRU Negative 07/10/2019    WBCUA see below 07/10/2019    BACTERIA 2+ 07/10/2019    RBCUA >100 07/10/2019    BLOODU LARGE 07/10/2019    SPECGRAV 1.015 07/10/2019    GLUCOSEU Negative 07/10/2019       Radiology:  XR CHEST PORTABLE   Final Result   1. Worsening right airspace disease and mild pulmonary vascular congestion.          XR CHEST PORTABLE   Final Result   New right parahilar and lower lobe airspace disease with critical care.     DVT Prophylaxis: SCDs  Diet: DIET TUBE FEED CONTINUOUS/CYCLIC NPO; 1.5 Calorie with Fiber; Orogastric; Continuous; 10; 65; 20  Code Status: Full Code    PT/OT Eval Status:     Nelson Dyer MD

## 2019-07-13 NOTE — PROGRESS NOTES
In patient Neurology consult        [facility] Neurology      Charmayne Knight, MD Butch Essex  1945    Date of Service: 7/13/2019    Follow-up note regarding acute encephalopathy and cardiac arrest      The patient was extubated. He is now awake and alert but waxing and waning and seems to be in delirium. Can follow simple direction but not consistent. Chart and lab reviewed. EEG showed picture of diffuse encephalopathy. No witnessed seizure or asymmetry in strength. Discussed with his daughter. Other review of system was limited. Past Medical History:   Diagnosis Date    High blood cholesterol     Hyperlipidemia     Hypertension     Hypertension     New onset seizure ([address]      History reviewed. No pertinent family history. History reviewed. No pertinent surgical history. History reviewed. No pertinent surgical history. History reviewed. No pertinent family history.   Social History     Tobacco Use    Smoking status: Current Every Day Smoker     Types: Pipe    Tobacco comment: alot a day   Substance Use Topics    Alcohol use: Yes     Comment: 2 glasses 8oz gen nightly     Drug use: No     No Known Allergies  Current Facility-Administered Medications   Medication Dose Route Frequency Provider Last Rate Last Dose    dexmedetomidine (PRECEDEX) 400 mcg in sodium chloride 0.9 % 100 mL infusion  0.2 mcg/kg/hr Intravenous Continuous Alvaro Amezquita MD 8 mL/hr at 07/13/19 1032 0.4 mcg/kg/hr at 07/13/19 1032    chlorothiazide (DIURIL) injection 500 mg  500 mg Intravenous Once Alvaro Amezquita MD        haloperidol lactate (HALDOL) injection 5 mg  5 mg Intravenous Q4H PRN Alvaro Amezquita MD        sodium chloride 0.9 % 535 mL with folic acid 1 mg, adult multi-vitamin with vitamin k 10 mL, thiamine 100 mg   Intravenous Daily Alvaro Amezquita  mL/hr at 07/13/19 0910      ondansetron (ZOFRAN) injection 4 mg  4 mg Intravenous Q6H PRN Kathi Cardoso MD        chlorhexidine (PERIDEX) 0.12 % solution 15 mL  15 mL Mouth/Throat BID Shahid Villela MD   15 mL at 07/13/19 0826    norepinephrine (LEVOPHED) 16 mg in sodium chloride 0.9 % 250 mL infusion  2 mcg/min Intravenous Continuous Shahid Villela MD 5.6 mL/hr at 07/13/19 0830 6 mcg/min at 07/13/19 0830    atorvastatin (LIPITOR) tablet 40 mg  40 mg Oral Daily Randalyn Rumpf, DO   40 mg at 07/13/19 0824    aspirin chewable tablet 81 mg  81 mg Oral Daily Randalyn Rumpf, DO   81 mg at 07/13/19 4061    sodium chloride flush 0.9 % injection 10 mL  10 mL Intravenous 2 times per day Randalyn Rumpf, DO   10 mL at 07/13/19 0825    sodium chloride flush 0.9 % injection 10 mL  10 mL Intravenous PRN Randalyn Rumpf, DO        acetaminophen (TYLENOL) tablet 650 mg  650 mg Oral Q4H PRN Isabeln Rumpf, DO        acetaminophen (TYLENOL) suppository 650 mg  650 mg Rectal Q4H PRN Shahid Villela MD        pantoprazole (PROTONIX) injection 40 mg  40 mg Intravenous Daily Shahid Villela MD   40 mg at 07/13/19 0824    potassium chloride 20 mEq/50 mL IVPB (Central Line)  20 mEq Intravenous PRN Shahid Villela MD 50 mL/hr at 07/10/19 1726 20 mEq at 07/10/19 1726    magnesium sulfate 1 g in dextrose 5% 100 mL IVPB  1 g Intravenous PRN Shahid Villela MD   Stopped at 07/10/19 0713    carboxymethylcellulose PF (THERATEARS) 1 % ophthalmic gel 1 drop  1 drop Both Eyes TID Shahid Villela MD   1 drop at 07/13/19 0825    mupirocin (BACTROBAN) 2 % ointment   Nasal BID Yamileth King MD        heparin 25,000 units in dextrose 5% 250 mL infusion  8.3 mL/hr Intravenous Continuous Yamileth King MD 8.3 mL/hr at 07/13/19 1209 8.3 mL/hr at 07/13/19 1209    fentaNYL (SUBLIMAZE) injection 25 mcg  25 mcg Intravenous Q1H PRN Sudhir Mcadams MD        amiodarone (CORDARONE) 450 mg in dextrose 5 % 250 mL infusion  0.5 mg/min Intravenous Continuous Lanetta Copas V, DO 16.7 mL/hr at 07/13/19 0341 0.5 mg/min at 07/13/19 0341    heparin (porcine) injection 4,000 Units  4,000 Units Intravenous PRN Raul Pates V, DO        heparin (porcine) injection 2,000 Units  2,000 Units Intravenous PRN Raul Pates V, DO        fentaNYL (SUBLIMAZE) 1,000 mcg in sodium chloride 0.9 % 100 mL infusion  25 mcg/hr Intravenous Continuous Raul Pates V, DO   Stopped at 07/12/19 0917       ROS: Limited due to MS changes or as per HPI. Constitutional:   Vitals:    07/13/19 0600 07/13/19 0700 07/13/19 0815 07/13/19 0822   BP:       Pulse: 60 66 71 68   Resp: 18 18 22 16   Temp:       TempSrc:       SpO2: 96% 97% 96% 95%   Weight:       Height:           General appearance: Confused   Eye: PRRR  Neck: supple  Cardiovascular: No carotid bruit. No lower leg edema with good pulsation. Mental Status:   AAO times one  Poor attention and concentration. Waxing and waning. Unable to assess recent or remote memory due to confusion  Language:soft and with poor comprehension and not following directions  Unable to assess fund of knowledge due to confusion. Cranial Nerves:   II: Visual fields: NT due to confusion. Pupils: equal, round, reactive to light  III,IV,VI: Extra Ocular Movements are intact. No nystagmus  V: Facial sensation : NT due to confusion  VII: Facial strength and movements: intact and symmetric  VIII: Hearing: NT due to confusion  IX: Palate elevation NT due to confusion  XI: Shoulder shrug: NT due to confusion  XII: Tongue movements: NT due to confusion  Musculoskeletal:  The patient can move all 4 extremities. No apparent focal weakness. Tone: Normal tone. No rigidity. Reflexes: Bilateral biceps 2/4, triceps 2/4, brachial radialis 2/4, knee 2/4 and ankle 2/4. Planters: flexor bilaterally.   Coordination: NT due to confusion  Sensation: NT due to confusion  Gait/Posture: NT due to confusion    Data:  LABS:   Lab Results   Component Value Date     07/13/2019    K 3.7 07/13/2019    K 3.7 07/13/2019     07/13/2019    CO2 22 07/13/2019    BUN 32 07/13/2019    CREATININE 0.8 07/13/2019    GFRAA >60 07/13/2019    LABGLOM >60 07/13/2019    GLUCOSE 123 07/13/2019    PHOS 2.5 07/13/2019    MG 2.00 07/13/2019    CALCIUM 8.1 07/13/2019     Lab Results   Component Value Date    WBC 15.8 07/11/2019    RBC 4.32 07/11/2019    HGB 13.8 07/11/2019    HCT 39.9 07/11/2019    MCV 92.3 07/11/2019    RDW 15.1 07/11/2019     07/11/2019     Lab Results   Component Value Date    INR 1.18 (H) 07/10/2019    PROTIME 13.5 (H) 07/10/2019       Neuroimaging and/or  labs reviewed by me and DW Family    Impression:  Acute encephalopathy and concern for hypoxic-ischemic encephalopathy. Cardiac arrest  Acute respiratory failure with hypoxia, improved  Acute kidney injury  Hypertension  CAD      Recommendation:  Continue current supportive care  Respiratory support  Aspiration precaution  Limit any sedation  Continue current blood pressure monitor and control  Aspirin  Statin  Continue anticoagulation  Prognosis is still guarded however we will see him Monday for another evaluation off sedation. Discussed with family. Thank you for referring such patient. If you have any questions regarding my consult note, please don't hesitate to call me. Young Laguna MD  [phone]    This dictation was generated by voice recognition computer software.  Although all attempts are made to edit the dictation for accuracy, there may be errors in the  transcription that are not intended

## 2019-07-13 NOTE — PROGRESS NOTES
During final assessment and while being bathed pt represents extreme restlessness. Pt has been able to follow commands such as squeezing hands, yes/no questions, wiggling toes, moving leg called on and making eye contact. VSS.  BP 93/65   Pulse 70   Temp 99.1 °F (37.3 °C) (Core)   Resp 13   Ht 5' 10\" (1.778 m)   Wt 176 lb 9.4 oz (80.1 kg)   SpO2 100%   BMI 25.34 kg/m²

## 2019-07-14 LAB
A/G RATIO: 1 (ref 1.1–2.2)
ALBUMIN SERPL-MCNC: 2.7 G/DL (ref 3.4–5)
ALP BLD-CCNC: 107 U/L (ref 40–129)
ALT SERPL-CCNC: 72 U/L (ref 10–40)
ANION GAP SERPL CALCULATED.3IONS-SCNC: 11 MMOL/L (ref 3–16)
APTT: 47 SEC (ref 26–36)
APTT: 54.6 SEC (ref 26–36)
APTT: 59.3 SEC (ref 26–36)
AST SERPL-CCNC: 50 U/L (ref 15–37)
BASE EXCESS ARTERIAL: 2.1 MMOL/L (ref -3–3)
BILIRUB SERPL-MCNC: 1 MG/DL (ref 0–1)
BUN BLDV-MCNC: 26 MG/DL (ref 7–20)
CALCIUM IONIZED: 1.1 MMOL/L (ref 1.12–1.32)
CALCIUM SERPL-MCNC: 8.7 MG/DL (ref 8.3–10.6)
CARBOXYHEMOGLOBIN ARTERIAL: 0.5 % (ref 0–1.5)
CHLORIDE BLD-SCNC: 103 MMOL/L (ref 99–110)
CO2: 27 MMOL/L (ref 21–32)
CREAT SERPL-MCNC: 0.8 MG/DL (ref 0.8–1.3)
GFR AFRICAN AMERICAN: >60
GFR NON-AFRICAN AMERICAN: >60
GLOBULIN: 2.6 G/DL
GLUCOSE BLD-MCNC: 95 MG/DL (ref 70–99)
HCO3 ARTERIAL: 24.6 MMOL/L (ref 21–29)
HEMOGLOBIN, ART, EXTENDED: 13.8 G/DL (ref 13.5–17.5)
MAGNESIUM: 1.8 MG/DL (ref 1.8–2.4)
METHEMOGLOBIN ARTERIAL: 0.6 %
O2 CONTENT ARTERIAL: 19 ML/DL
O2 SAT, ARTERIAL: 98.4 %
O2 THERAPY: ABNORMAL
PCO2 ARTERIAL: 32.1 MMHG (ref 35–45)
PH ARTERIAL: 7.5 (ref 7.35–7.45)
PH VENOUS: 7.48 (ref 7.35–7.45)
PO2 ARTERIAL: 128.9 MMHG (ref 75–108)
POTASSIUM REFLEX MAGNESIUM: 3.4 MMOL/L (ref 3.5–5.1)
POTASSIUM SERPL-SCNC: 3.4 MMOL/L (ref 3.5–5.1)
SODIUM BLD-SCNC: 141 MMOL/L (ref 136–145)
TCO2 ARTERIAL: 25.6 MMOL/L
TOTAL PROTEIN: 5.3 G/DL (ref 6.4–8.2)

## 2019-07-14 PROCEDURE — 83735 ASSAY OF MAGNESIUM: CPT

## 2019-07-14 PROCEDURE — 2000000000 HC ICU R&B

## 2019-07-14 PROCEDURE — 82803 BLOOD GASES ANY COMBINATION: CPT

## 2019-07-14 PROCEDURE — 2580000003 HC RX 258: Performed by: INTERNAL MEDICINE

## 2019-07-14 PROCEDURE — 2100000000 HC CCU R&B

## 2019-07-14 PROCEDURE — 99291 CRITICAL CARE FIRST HOUR: CPT | Performed by: INTERNAL MEDICINE

## 2019-07-14 PROCEDURE — 6360000002 HC RX W HCPCS: Performed by: INTERNAL MEDICINE

## 2019-07-14 PROCEDURE — 94761 N-INVAS EAR/PLS OXIMETRY MLT: CPT

## 2019-07-14 PROCEDURE — 82330 ASSAY OF CALCIUM: CPT

## 2019-07-14 PROCEDURE — P9047 ALBUMIN (HUMAN), 25%, 50ML: HCPCS | Performed by: INTERNAL MEDICINE

## 2019-07-14 PROCEDURE — 2500000003 HC RX 250 WO HCPCS: Performed by: INTERNAL MEDICINE

## 2019-07-14 PROCEDURE — 80053 COMPREHEN METABOLIC PANEL: CPT

## 2019-07-14 PROCEDURE — 85730 THROMBOPLASTIN TIME PARTIAL: CPT

## 2019-07-14 PROCEDURE — 94660 CPAP INITIATION&MGMT: CPT

## 2019-07-14 PROCEDURE — 2700000000 HC OXYGEN THERAPY PER DAY

## 2019-07-14 PROCEDURE — 6360000002 HC RX W HCPCS: Performed by: EMERGENCY MEDICINE

## 2019-07-14 PROCEDURE — 2580000003 HC RX 258: Performed by: EMERGENCY MEDICINE

## 2019-07-14 RX ORDER — HEPARIN SODIUM 1000 [USP'U]/ML
2000 INJECTION, SOLUTION INTRAVENOUS; SUBCUTANEOUS ONCE
Status: COMPLETED | OUTPATIENT
Start: 2019-07-14 | End: 2019-07-14

## 2019-07-14 RX ORDER — CHLOROTHIAZIDE SODIUM 500 MG/1
500 INJECTION INTRAVENOUS ONCE
Status: COMPLETED | OUTPATIENT
Start: 2019-07-14 | End: 2019-07-14

## 2019-07-14 RX ORDER — MAGNESIUM SULFATE IN WATER 40 MG/ML
2 INJECTION, SOLUTION INTRAVENOUS ONCE
Status: COMPLETED | OUTPATIENT
Start: 2019-07-14 | End: 2019-07-14

## 2019-07-14 RX ORDER — ALBUMIN (HUMAN) 12.5 G/50ML
25 SOLUTION INTRAVENOUS ONCE
Status: COMPLETED | OUTPATIENT
Start: 2019-07-14 | End: 2019-07-14

## 2019-07-14 RX ORDER — SODIUM CHLORIDE 9 MG/ML
INJECTION, SOLUTION INTRAVENOUS
Status: DISPENSED
Start: 2019-07-14 | End: 2019-07-14

## 2019-07-14 RX ORDER — FUROSEMIDE 10 MG/ML
20 INJECTION INTRAMUSCULAR; INTRAVENOUS ONCE
Status: COMPLETED | OUTPATIENT
Start: 2019-07-14 | End: 2019-07-14

## 2019-07-14 RX ADMIN — HEPARIN SODIUM AND DEXTROSE 12.8 ML/HR: 10000; 5 INJECTION INTRAVENOUS at 14:52

## 2019-07-14 RX ADMIN — Medication 15 ML: at 21:19

## 2019-07-14 RX ADMIN — DEXMEDETOMIDINE HYDROCHLORIDE 2.2 MCG/KG/HR: 100 INJECTION, SOLUTION INTRAVENOUS at 23:56

## 2019-07-14 RX ADMIN — MUPIROCIN: 20 OINTMENT TOPICAL at 21:19

## 2019-07-14 RX ADMIN — Medication 15 ML: at 09:01

## 2019-07-14 RX ADMIN — CHLOROTHIAZIDE SODIUM 500 MG: 500 INJECTION, POWDER, LYOPHILIZED, FOR SOLUTION INTRAVENOUS at 10:58

## 2019-07-14 RX ADMIN — PANTOPRAZOLE SODIUM 40 MG: 40 INJECTION, POWDER, FOR SOLUTION INTRAVENOUS at 09:01

## 2019-07-14 RX ADMIN — MAGNESIUM SULFATE HEPTAHYDRATE 2 G: 40 INJECTION, SOLUTION INTRAVENOUS at 10:58

## 2019-07-14 RX ADMIN — DEXMEDETOMIDINE HYDROCHLORIDE 2 MCG/KG/HR: 100 INJECTION, SOLUTION INTRAVENOUS at 18:58

## 2019-07-14 RX ADMIN — POTASSIUM CHLORIDE 20 MEQ: 29.8 INJECTION, SOLUTION INTRAVENOUS at 06:34

## 2019-07-14 RX ADMIN — DEXMEDETOMIDINE HYDROCHLORIDE 2.4 MCG/KG/HR: 100 INJECTION, SOLUTION INTRAVENOUS at 21:17

## 2019-07-14 RX ADMIN — AMIODARONE HYDROCHLORIDE 0.5 MG/MIN: 50 INJECTION, SOLUTION INTRAVENOUS at 14:39

## 2019-07-14 RX ADMIN — ONDANSETRON 4 MG: 2 INJECTION INTRAMUSCULAR; INTRAVENOUS at 09:17

## 2019-07-14 RX ADMIN — DEXMEDETOMIDINE HYDROCHLORIDE 2 MCG/KG/HR: 100 INJECTION, SOLUTION INTRAVENOUS at 15:51

## 2019-07-14 RX ADMIN — DEXMEDETOMIDINE HYDROCHLORIDE 1.8 MCG/KG/HR: 100 INJECTION, SOLUTION INTRAVENOUS at 06:18

## 2019-07-14 RX ADMIN — Medication 10 ML: at 09:01

## 2019-07-14 RX ADMIN — POTASSIUM CHLORIDE 20 MEQ: 29.8 INJECTION, SOLUTION INTRAVENOUS at 05:30

## 2019-07-14 RX ADMIN — DEXMEDETOMIDINE HYDROCHLORIDE 2 MCG/KG/HR: 100 INJECTION, SOLUTION INTRAVENOUS at 11:32

## 2019-07-14 RX ADMIN — DEXMEDETOMIDINE HYDROCHLORIDE 2 MCG/KG/HR: 100 INJECTION, SOLUTION INTRAVENOUS at 14:05

## 2019-07-14 RX ADMIN — ALBUMIN (HUMAN) 25 G: 0.25 INJECTION, SOLUTION INTRAVENOUS at 11:20

## 2019-07-14 RX ADMIN — FOLIC ACID: 5 INJECTION, SOLUTION INTRAMUSCULAR; INTRAVENOUS; SUBCUTANEOUS at 09:08

## 2019-07-14 RX ADMIN — DEXMEDETOMIDINE HYDROCHLORIDE 1.4 MCG/KG/HR: 100 INJECTION, SOLUTION INTRAVENOUS at 02:41

## 2019-07-14 RX ADMIN — MUPIROCIN: 20 OINTMENT TOPICAL at 09:02

## 2019-07-14 RX ADMIN — DEXMEDETOMIDINE HYDROCHLORIDE 1.8 MCG/KG/HR: 100 INJECTION, SOLUTION INTRAVENOUS at 09:42

## 2019-07-14 RX ADMIN — Medication 10 ML: at 21:23

## 2019-07-14 RX ADMIN — AMIODARONE HYDROCHLORIDE 0.5 MG/MIN: 50 INJECTION, SOLUTION INTRAVENOUS at 23:19

## 2019-07-14 RX ADMIN — HEPARIN SODIUM 2000 UNITS: 1000 INJECTION, SOLUTION INTRAVENOUS; SUBCUTANEOUS at 05:26

## 2019-07-14 RX ADMIN — FUROSEMIDE 20 MG: 10 INJECTION, SOLUTION INTRAMUSCULAR; INTRAVENOUS at 10:58

## 2019-07-14 NOTE — PROGRESS NOTES
Pulmonary & Critical Care Inpatient Progress Note   Baljit Snyder MD     REASON FOR TODAY'S VISIT:  Assistance with critical care management    SUBJECTIVE:   Remains on vapotherm/high flow oxygen. Intermittent confusion but responsive to precedex gtt  Copious urine output with diuresis yesterday    Scheduled Meds:   chlorhexidine  15 mL Mouth/Throat BID    atorvastatin  40 mg Oral Daily    aspirin  81 mg Oral Daily    sodium chloride flush  10 mL Intravenous 2 times per day    pantoprazole  40 mg Intravenous Daily    mupirocin   Nasal BID       Continuous Infusions:   sodium chloride      dexmedetomidine (PRECEDEX) IV infusion 2 mcg/kg/hr (07/14/19 1405)    norepinephrine (LEVOPHED) infusion 2 mcg/min (07/14/19 0909)    heparin (porcine) 12.8 mL/hr (07/14/19 1452)    amiodarone 0.5 mg/min (07/14/19 1439)    fentaNYL (SUBLIMAZE) infusion Stopped (07/12/19 0917)       PRN Meds:  haloperidol lactate, LORazepam, ondansetron, sodium chloride flush, acetaminophen, acetaminophen, potassium chloride, magnesium sulfate, fentanNYL, heparin (porcine), heparin (porcine)    ALLERGIES:  Patient has No Known Allergies. Objective:   PHYSICAL EXAM:  /75   Pulse 70   Temp 99.1 °F (37.3 °C) (Core)   Resp 13   Ht 5' 10\" (1.778 m)   Wt 176 lb 9.4 oz (80.1 kg)   SpO2 100%   BMI 25.34 kg/m²    Physical Exam   Constitutional: He appears well-developed. No distress. HENT:   Head: Normocephalic and atraumatic. Mouth/Throat: Oropharynx is clear and moist. No oropharyngeal exudate. Neck: Neck supple. No JVD present. Cardiovascular: Normal heart sounds. Exam reveals no gallop and no friction rub. No murmur heard. Pulmonary/Chest: He has no wheezes. He has no rales. Equal chest rise and expansion bilaterally   Abdominal: Soft. Bowel sounds are normal. He exhibits no distension. There is no tenderness. Lymphadenopathy:     He has no cervical adenopathy.    Neurological:   Confused, moves all extremities

## 2019-07-14 NOTE — PROGRESS NOTES
care following. 2.  Acute metabolic encephalopathy, neurology consulted and following. 3.  Coronary artery disease multivessel further evaluation per CT surgery for possible CABG.   4.  Acute respiratory failure with hypoxia requiring intubation patient was extubated on 7/13/2019, respiratory care per critical care on Vapotherm, antibiotic for possible aspiration pneumonia.       DVT Prophylaxis: Heparin drip  Diet: DIET TUBE FEED CONTINUOUS/CYCLIC NPO; 1.5 Calorie with Fiber; Orogastric; Continuous; 10; 65; 20  Code Status: Full Code    PT/OT Eval Status:     Holly Eldridge MD

## 2019-07-14 NOTE — PROGRESS NOTES
Shift assessment complete and documented on flow sheets. VSS. Four eyes skin assessment and MAR handoff completed with previous nurse. Repositioned for comfort, call light and over bed table in reach. Patient has no requests at this time.  Will continue to monitor.

## 2019-07-15 ENCOUNTER — APPOINTMENT (OUTPATIENT)
Dept: GENERAL RADIOLOGY | Age: 74
DRG: 286 | End: 2019-07-15
Payer: MEDICARE

## 2019-07-15 ENCOUNTER — APPOINTMENT (OUTPATIENT)
Dept: CT IMAGING | Age: 74
DRG: 286 | End: 2019-07-15
Payer: MEDICARE

## 2019-07-15 LAB
A/G RATIO: 1.1 (ref 1.1–2.2)
ALBUMIN SERPL-MCNC: 1.6 G/DL (ref 3.4–5)
ALP BLD-CCNC: 53 U/L (ref 40–129)
ALT SERPL-CCNC: 55 U/L (ref 10–40)
ANION GAP SERPL CALCULATED.3IONS-SCNC: 8 MMOL/L (ref 3–16)
APTT: 49.7 SEC (ref 26–36)
AST SERPL-CCNC: 34 U/L (ref 15–37)
BASE EXCESS ARTERIAL: 3.4 MMOL/L (ref -3–3)
BILIRUB SERPL-MCNC: 0.5 MG/DL (ref 0–1)
BUN BLDV-MCNC: 23 MG/DL (ref 7–20)
CALCIUM IONIZED: 0.84 MMOL/L (ref 1.12–1.32)
CALCIUM SERPL-MCNC: 5 MG/DL (ref 8.3–10.6)
CARBOXYHEMOGLOBIN ARTERIAL: 0.7 % (ref 0–1.5)
CHLORIDE BLD-SCNC: 119 MMOL/L (ref 99–110)
CO2: 19 MMOL/L (ref 21–32)
CREAT SERPL-MCNC: <0.5 MG/DL (ref 0.8–1.3)
GFR AFRICAN AMERICAN: >60
GFR NON-AFRICAN AMERICAN: >60
GLOBULIN: 1.5 G/DL
GLUCOSE BLD-MCNC: 69 MG/DL (ref 70–99)
HCO3 ARTERIAL: 26.7 MMOL/L (ref 21–29)
HEMOGLOBIN, ART, EXTENDED: 14 G/DL (ref 13.5–17.5)
MAGNESIUM: 1.3 MG/DL (ref 1.8–2.4)
METHEMOGLOBIN ARTERIAL: 0.5 %
O2 CONTENT ARTERIAL: 19 ML/DL
O2 SAT, ARTERIAL: 94.9 %
O2 THERAPY: ABNORMAL
PCO2 ARTERIAL: 36.4 MMHG (ref 35–45)
PH ARTERIAL: 7.48 (ref 7.35–7.45)
PH VENOUS: 7.46 (ref 7.35–7.45)
PO2 ARTERIAL: 74.6 MMHG (ref 75–108)
POTASSIUM REFLEX MAGNESIUM: 2 MMOL/L (ref 3.5–5.1)
POTASSIUM SERPL-SCNC: 2 MMOL/L (ref 3.5–5.1)
POTASSIUM SERPL-SCNC: 3.9 MMOL/L (ref 3.5–5.1)
SODIUM BLD-SCNC: 146 MMOL/L (ref 136–145)
TCO2 ARTERIAL: 27.8 MMOL/L
TOTAL PROTEIN: 3.1 G/DL (ref 6.4–8.2)

## 2019-07-15 PROCEDURE — 2580000003 HC RX 258: Performed by: INTERNAL MEDICINE

## 2019-07-15 PROCEDURE — 6360000002 HC RX W HCPCS

## 2019-07-15 PROCEDURE — 99291 CRITICAL CARE FIRST HOUR: CPT | Performed by: INTERNAL MEDICINE

## 2019-07-15 PROCEDURE — 2500000003 HC RX 250 WO HCPCS: Performed by: INTERNAL MEDICINE

## 2019-07-15 PROCEDURE — 2000000000 HC ICU R&B

## 2019-07-15 PROCEDURE — 6360000002 HC RX W HCPCS: Performed by: INTERNAL MEDICINE

## 2019-07-15 PROCEDURE — 70450 CT HEAD/BRAIN W/O DYE: CPT

## 2019-07-15 PROCEDURE — 2700000000 HC OXYGEN THERAPY PER DAY

## 2019-07-15 PROCEDURE — 71045 X-RAY EXAM CHEST 1 VIEW: CPT

## 2019-07-15 PROCEDURE — 92610 EVALUATE SWALLOWING FUNCTION: CPT

## 2019-07-15 PROCEDURE — 94761 N-INVAS EAR/PLS OXIMETRY MLT: CPT

## 2019-07-15 PROCEDURE — 92526 ORAL FUNCTION THERAPY: CPT

## 2019-07-15 PROCEDURE — 80053 COMPREHEN METABOLIC PANEL: CPT

## 2019-07-15 PROCEDURE — 84132 ASSAY OF SERUM POTASSIUM: CPT

## 2019-07-15 PROCEDURE — 99233 SBSQ HOSP IP/OBS HIGH 50: CPT | Performed by: INTERNAL MEDICINE

## 2019-07-15 PROCEDURE — 6370000000 HC RX 637 (ALT 250 FOR IP): Performed by: INTERNAL MEDICINE

## 2019-07-15 PROCEDURE — 85730 THROMBOPLASTIN TIME PARTIAL: CPT

## 2019-07-15 PROCEDURE — 6370000000 HC RX 637 (ALT 250 FOR IP)

## 2019-07-15 PROCEDURE — 99232 SBSQ HOSP IP/OBS MODERATE 35: CPT | Performed by: PSYCHIATRY & NEUROLOGY

## 2019-07-15 PROCEDURE — 82330 ASSAY OF CALCIUM: CPT

## 2019-07-15 PROCEDURE — 82803 BLOOD GASES ANY COMBINATION: CPT

## 2019-07-15 PROCEDURE — 83735 ASSAY OF MAGNESIUM: CPT

## 2019-07-15 RX ORDER — CALCIUM GLUCONATE 94 MG/ML
1 INJECTION, SOLUTION INTRAVENOUS ONCE
Status: COMPLETED | OUTPATIENT
Start: 2019-07-15 | End: 2019-07-15

## 2019-07-15 RX ORDER — AMIODARONE HYDROCHLORIDE 200 MG/1
200 TABLET ORAL 2 TIMES DAILY
Status: DISCONTINUED | OUTPATIENT
Start: 2019-07-15 | End: 2019-07-16 | Stop reason: HOSPADM

## 2019-07-15 RX ORDER — HEPARIN SODIUM 1000 [USP'U]/ML
2000 INJECTION, SOLUTION INTRAVENOUS; SUBCUTANEOUS ONCE
Status: COMPLETED | OUTPATIENT
Start: 2019-07-15 | End: 2019-07-15

## 2019-07-15 RX ORDER — LORAZEPAM 2 MG/ML
0.5 INJECTION INTRAMUSCULAR EVERY 5 MIN PRN
Status: DISCONTINUED | OUTPATIENT
Start: 2019-07-15 | End: 2019-07-16 | Stop reason: HOSPADM

## 2019-07-15 RX ORDER — ASPIRIN 81 MG/1
324 TABLET, CHEWABLE ORAL DAILY
Status: DISCONTINUED | OUTPATIENT
Start: 2019-07-16 | End: 2019-07-16 | Stop reason: HOSPADM

## 2019-07-15 RX ADMIN — Medication 15 ML: at 08:35

## 2019-07-15 RX ADMIN — DEXMEDETOMIDINE HYDROCHLORIDE 2.4 MCG/KG/HR: 100 INJECTION, SOLUTION INTRAVENOUS at 07:43

## 2019-07-15 RX ADMIN — AMIODARONE HYDROCHLORIDE 200 MG: 200 TABLET ORAL at 20:51

## 2019-07-15 RX ADMIN — DEXMEDETOMIDINE HYDROCHLORIDE 0.5 MCG/KG/HR: 100 INJECTION, SOLUTION INTRAVENOUS at 13:51

## 2019-07-15 RX ADMIN — POTASSIUM CHLORIDE 20 MEQ: 29.8 INJECTION, SOLUTION INTRAVENOUS at 06:19

## 2019-07-15 RX ADMIN — DEXMEDETOMIDINE HYDROCHLORIDE 2.4 MCG/KG/HR: 100 INJECTION, SOLUTION INTRAVENOUS at 05:12

## 2019-07-15 RX ADMIN — NOREPINEPHRINE BITARTRATE 2 MCG/MIN: 1 INJECTION INTRAVENOUS at 11:13

## 2019-07-15 RX ADMIN — DEXMEDETOMIDINE HYDROCHLORIDE 2.4 MCG/KG/HR: 100 INJECTION, SOLUTION INTRAVENOUS at 02:58

## 2019-07-15 RX ADMIN — Medication 10 ML: at 08:42

## 2019-07-15 RX ADMIN — ENOXAPARIN SODIUM 70 MG: 80 INJECTION SUBCUTANEOUS at 21:16

## 2019-07-15 RX ADMIN — Medication 15 ML: at 20:55

## 2019-07-15 RX ADMIN — LORAZEPAM 0.5 MG: 2 INJECTION, SOLUTION INTRAMUSCULAR; INTRAVENOUS at 12:25

## 2019-07-15 RX ADMIN — POTASSIUM CHLORIDE 20 MEQ: 29.8 INJECTION, SOLUTION INTRAVENOUS at 07:45

## 2019-07-15 RX ADMIN — PANTOPRAZOLE SODIUM 40 MG: 40 INJECTION, POWDER, FOR SOLUTION INTRAVENOUS at 08:41

## 2019-07-15 RX ADMIN — CALCIUM GLUCONATE 2 G: 98 INJECTION, SOLUTION INTRAVENOUS at 10:08

## 2019-07-15 RX ADMIN — Medication: at 08:51

## 2019-07-15 RX ADMIN — ATORVASTATIN CALCIUM 40 MG: 40 TABLET, FILM COATED ORAL at 10:29

## 2019-07-15 RX ADMIN — AMIODARONE HYDROCHLORIDE 200 MG: 200 TABLET ORAL at 10:29

## 2019-07-15 RX ADMIN — DEXMEDETOMIDINE HYDROCHLORIDE 2 MCG/KG/HR: 100 INJECTION, SOLUTION INTRAVENOUS at 10:17

## 2019-07-15 RX ADMIN — POTASSIUM CHLORIDE 20 MEQ: 29.8 INJECTION, SOLUTION INTRAVENOUS at 08:49

## 2019-07-15 RX ADMIN — Medication 10 ML: at 20:55

## 2019-07-15 RX ADMIN — HEPARIN SODIUM 2000 UNITS: 1000 INJECTION, SOLUTION INTRAVENOUS; SUBCUTANEOUS at 08:39

## 2019-07-15 RX ADMIN — CALCIUM GLUCONATE 1 G: 98 INJECTION, SOLUTION INTRAVENOUS at 08:37

## 2019-07-15 NOTE — PROGRESS NOTES
07/15/19 1718   Oxygen Therapy/Pulse Ox   O2 Therapy Oxygen   O2 Device Heated high flow cannula   O2 Flow Rate (L/min) 33 L/min   FiO2  100 %   Resp 19   SpO2 97 %

## 2019-07-15 NOTE — PLAN OF CARE
Completed swallowing evaluation. Please refer to EMR.     Juan Luis Antunez M.A., [address]  Speech-Language Pathologist

## 2019-07-15 NOTE — PROGRESS NOTES
AAO times one  Poor attention and concentration. Waxing and waning. Unable to assess recent or remote memory due to confusion  Language:soft and with poor comprehension and not following directions  Unable to assess fund of knowledge due to confusion. Cranial Nerves:   II: Visual fields: NT due to confusion. Pupils: equal, round, reactive to light  III,IV,VI: Extra Ocular Movements are intact. No nystagmus  V: Facial sensation : NT due to confusion  VII: Facial strength and movements: intact and symmetric  VIII: Hearing: NT due to confusion  IX: Palate elevation NT due to confusion  XI: Shoulder shrug: NT due to confusion  XII: Tongue movements: NT due to confusion  Musculoskeletal:  The patient can move all 4 extremities. No apparent focal weakness. Tone: Normal tone. No rigidity. Reflexes: Bilateral biceps 2/4, triceps 2/4, brachial radialis 2/4, knee 2/4 and ankle 2/4. Planters: flexor bilaterally. Coordination: NT due to confusion  Sensation: NT due to confusion  Gait/Posture: NT due to confusion  No change in exam    Data:  LABS:   Lab Results   Component Value Date     07/15/2019    K 2.0 07/15/2019    K 2.0 07/15/2019     07/15/2019    CO2 19 07/15/2019    BUN 23 07/15/2019    CREATININE <0.5 07/15/2019    GFRAA >60 07/15/2019    LABGLOM >60 07/15/2019    GLUCOSE 69 07/15/2019    PHOS 2.5 07/13/2019    MG 1.30 07/15/2019    CALCIUM 5.0 07/15/2019     Lab Results   Component Value Date    WBC 15.8 07/11/2019    RBC 4.32 07/11/2019    HGB 13.8 07/11/2019    HCT 39.9 07/11/2019    MCV 92.3 07/11/2019    RDW 15.1 07/11/2019     07/11/2019     Lab Results   Component Value Date    INR 1.18 (H) 07/10/2019    PROTIME 13.5 (H) 07/10/2019       Neuroimaging and/or  labs reviewed by me and DW Family    Impression:  Acute encephalopathy and concern for hypoxic-ischemic encephalopathy.  The same  Acute delirium likely multifactorial   cardiac arrest  Acute respiratory failure with

## 2019-07-15 NOTE — PROGRESS NOTES
stage dysphagia; Moderate oral stage dysphagia    Dysphagia Impression : Patient presents with suspected moderate dysphagia, with concern for possible silent aspiration with thin liquids (miscoordinated swallow noted with thins via straw, cup sip, anticipated cough but none produced). No overt s/s aspiration or penetration evident for ice chips or tsp sips water. Given pt's current pulmonary status (intubated 7/9-7/13, on 11 L O2 HFNC, wet sounding lungs per NP, concern for possible aspiration pneumonia), recommend continue NPO at this time except meds crushed in puree as able, agressive oral care, and ice chips. Recommend completion of instrumental swallow evaluation prior to diet upgrade. Dysphagia Outcome Severity Scale: Level 2: Moderate Severe dysphagia- Maximum assistance or maximum use of strategies with partial PO only     Treatment Plan  Requires SLP Intervention: Yes  Duration/Frequency of Treatment: 3-5x/week for 2 weeks (07/29/19)        Recommended Diet and Intervention  Diet Solids Recommendation: NPO  Liquid Consistency Recommendation: NPO(can do ice chips with oral care)  Recommended Form of Meds: Crushed in puree as able  Recommendations: Modified barium swallow study; Dysphagia treatment;NPO;Consider ice chips PRN  Therapeutic Interventions: Diet tolerance monitoring;Patient/Family education; Therapeutic PO trials with SLP;Oral care    Compensatory Swallowing Strategies  Compensatory Swallowing Strategies: Remain upright for 30-45 minutes after meals;Upright as possible for all oral intake    Treatment/Goals  Short-term Goals  Timeframe for Short-term Goals: 1 week (07/22/19)  Goal 1: Patient will tolerate instrumental swallowing procedure. Goal 2: Patient/caregiver will demonstrate understanding of compensatory strategies for improved swallowing safety.   Long-term Goals  Timeframe for Long-term Goals: 2 weeks (07/29/19)  Goal 1: Patient will tolerate recommended diet without observed clinical

## 2019-07-15 NOTE — PROGRESS NOTES
Labs, Monitor Bowel Function, TF Intake, TF Tolerance      Electronically signed by Terrell Babin RD, LD on 7/15/19 at 12:24 PM  Contact Number: Office: [phone]; [address]: [phone]

## 2019-07-15 NOTE — PROGRESS NOTES
07/15/19 1236   Oxygen Therapy/Pulse Ox   O2 Therapy Oxygen humidified   O2 Device Heated high flow cannula   O2 Flow Rate (L/min) 40 L/min   FiO2  100 %   Resp 22   SpO2 (!) 85 %

## 2019-07-15 NOTE — PLAN OF CARE
Problem: Restraint Use - Nonviolent/Non-Self-Destructive Behavior:  Goal: Absence of restraint indications  Description  Absence of restraint indications  Outcome: Ongoing  Note:   Patient still demonstrating indications of the need to be restrained at this time. Visual safety checks performed every hour, and restraint monitoring performed every two hours. Patient has no signs of injuries from restraints at this time. Educated pt on the need for the restraints, no evidence of learning. Will continue to monitor. Goal: Absence of restraint-related injury  Description  Absence of restraint-related injury  Outcome: Ongoing     Problem: Falls - Risk of:  Goal: Will remain free from falls  Description  Will remain free from falls  Outcome: Ongoing  Goal: Absence of physical injury  Description  Absence of physical injury  Outcome: Ongoing     Problem: Risk for Impaired Skin Integrity  Goal: Tissue integrity - skin and mucous membranes  Description  Structural intactness and normal physiological function of skin and  mucous membranes.   Outcome: Ongoing     Problem: Nutrition  Goal: Optimal nutrition therapy  Outcome: Ongoing     Problem: Discharge Planning:  Goal: Participates in care planning  Description  Participates in care planning  Outcome: Ongoing  Goal: Discharged to appropriate level of care  Description  Discharged to appropriate level of care  Outcome: Ongoing     Problem: Airway Clearance - Ineffective:  Goal: Ability to maintain a clear airway will improve  Description  Ability to maintain a clear airway will improve  Outcome: Ongoing     Problem: Aspiration:  Goal: Absence of aspiration  Description  Absence of aspiration  Outcome: Ongoing     Problem: Cardiac Output - Decreased:  Goal: Hemodynamic stability will improve  Description  Hemodynamic stability will improve  Outcome: Ongoing     Problem: Fluid Volume - Imbalance:  Goal: Absence of imbalanced fluid volume signs and symptoms  Description  Absence of imbalanced fluid volume signs and symptoms  Outcome: Ongoing     Problem: Gas Exchange - Impaired:  Goal: Levels of oxygenation will improve  Description  Levels of oxygenation will improve  Outcome: Ongoing     Problem: Pain:  Goal: Pain level will decrease  Description  Pain level will decrease  Outcome: Ongoing  Goal: Recognizes and communicates pain  Description  Recognizes and communicates pain  Outcome: Ongoing  Goal: Control of acute pain  Description  Control of acute pain  Outcome: Ongoing     Problem: Skin Integrity - Impaired:  Goal: Will show no infection signs and symptoms  Description  Will show no infection signs and symptoms  Outcome: Ongoing  Goal: Absence of new skin breakdown  Description  Absence of new skin breakdown  Outcome: Ongoing     Problem: Tissue Perfusion, Altered:  Goal: Circulatory function within specified parameters  Description  Circulatory function within specified parameters  Outcome: Ongoing

## 2019-07-16 ENCOUNTER — APPOINTMENT (OUTPATIENT)
Dept: GENERAL RADIOLOGY | Age: 74
DRG: 286 | End: 2019-07-16
Payer: MEDICARE

## 2019-07-16 VITALS
DIASTOLIC BLOOD PRESSURE: 61 MMHG | TEMPERATURE: 99 F | RESPIRATION RATE: 18 BRPM | SYSTOLIC BLOOD PRESSURE: 107 MMHG | BODY MASS INDEX: 23.1 KG/M2 | HEART RATE: 61 BPM | HEIGHT: 70 IN | WEIGHT: 161.38 LBS | OXYGEN SATURATION: 94 %

## 2019-07-16 LAB
A/G RATIO: 0.9 (ref 1.1–2.2)
ALBUMIN SERPL-MCNC: 2.6 G/DL (ref 3.4–5)
ALP BLD-CCNC: 91 U/L (ref 40–129)
ALT SERPL-CCNC: 112 U/L (ref 10–40)
ANION GAP SERPL CALCULATED.3IONS-SCNC: 13 MMOL/L (ref 3–16)
APTT: 30.4 SEC (ref 26–36)
AST SERPL-CCNC: 61 U/L (ref 15–37)
BASE EXCESS ARTERIAL: -1.3 MMOL/L (ref -3–3)
BILIRUB SERPL-MCNC: 0.9 MG/DL (ref 0–1)
BUN BLDV-MCNC: 52 MG/DL (ref 7–20)
CALCIUM IONIZED: 1.11 MMOL/L (ref 1.12–1.32)
CALCIUM SERPL-MCNC: 8.7 MG/DL (ref 8.3–10.6)
CARBOXYHEMOGLOBIN ARTERIAL: 0.3 % (ref 0–1.5)
CHLORIDE BLD-SCNC: 104 MMOL/L (ref 99–110)
CO2: 24 MMOL/L (ref 21–32)
CREAT SERPL-MCNC: 1.1 MG/DL (ref 0.8–1.3)
GFR AFRICAN AMERICAN: >60
GFR NON-AFRICAN AMERICAN: >60
GLOBULIN: 2.8 G/DL
GLUCOSE BLD-MCNC: 82 MG/DL (ref 70–99)
HCO3 ARTERIAL: 22.8 MMOL/L (ref 21–29)
HEMOGLOBIN, ART, EXTENDED: 15 G/DL (ref 13.5–17.5)
MAGNESIUM: 2.2 MG/DL (ref 1.8–2.4)
METHEMOGLOBIN ARTERIAL: 0.5 %
O2 CONTENT ARTERIAL: 20 ML/DL
O2 SAT, ARTERIAL: 96.3 %
O2 THERAPY: NORMAL
PCO2 ARTERIAL: 36.6 MMHG (ref 35–45)
PH ARTERIAL: 7.41 (ref 7.35–7.45)
PH VENOUS: 7.41 (ref 7.35–7.45)
PO2 ARTERIAL: 90 MMHG (ref 75–108)
POTASSIUM REFLEX MAGNESIUM: 3.8 MMOL/L (ref 3.5–5.1)
POTASSIUM SERPL-SCNC: 3.8 MMOL/L (ref 3.5–5.1)
SODIUM BLD-SCNC: 141 MMOL/L (ref 136–145)
TCO2 ARTERIAL: 23.9 MMOL/L
TOTAL PROTEIN: 5.4 G/DL (ref 6.4–8.2)

## 2019-07-16 PROCEDURE — 2580000003 HC RX 258: Performed by: INTERNAL MEDICINE

## 2019-07-16 PROCEDURE — 93971 EXTREMITY STUDY: CPT

## 2019-07-16 PROCEDURE — 80053 COMPREHEN METABOLIC PANEL: CPT

## 2019-07-16 PROCEDURE — 82803 BLOOD GASES ANY COMBINATION: CPT

## 2019-07-16 PROCEDURE — 85730 THROMBOPLASTIN TIME PARTIAL: CPT

## 2019-07-16 PROCEDURE — 6360000002 HC RX W HCPCS: Performed by: INTERNAL MEDICINE

## 2019-07-16 PROCEDURE — 6370000000 HC RX 637 (ALT 250 FOR IP): Performed by: INTERNAL MEDICINE

## 2019-07-16 PROCEDURE — 99233 SBSQ HOSP IP/OBS HIGH 50: CPT | Performed by: INTERNAL MEDICINE

## 2019-07-16 PROCEDURE — 94761 N-INVAS EAR/PLS OXIMETRY MLT: CPT

## 2019-07-16 PROCEDURE — 82330 ASSAY OF CALCIUM: CPT

## 2019-07-16 PROCEDURE — 71045 X-RAY EXAM CHEST 1 VIEW: CPT

## 2019-07-16 PROCEDURE — 83735 ASSAY OF MAGNESIUM: CPT

## 2019-07-16 PROCEDURE — 2500000003 HC RX 250 WO HCPCS: Performed by: INTERNAL MEDICINE

## 2019-07-16 PROCEDURE — 93880 EXTRACRANIAL BILAT STUDY: CPT

## 2019-07-16 PROCEDURE — 94660 CPAP INITIATION&MGMT: CPT

## 2019-07-16 PROCEDURE — 2700000000 HC OXYGEN THERAPY PER DAY

## 2019-07-16 PROCEDURE — 92526 ORAL FUNCTION THERAPY: CPT

## 2019-07-16 RX ORDER — AMIODARONE HYDROCHLORIDE 200 MG/1
200 TABLET ORAL 2 TIMES DAILY
Qty: 30 TABLET | Refills: 3
Start: 2019-07-16 | End: 2019-09-28

## 2019-07-16 RX ORDER — LORAZEPAM 2 MG/ML
1 INJECTION INTRAMUSCULAR EVERY 6 HOURS PRN
Qty: 0.5 ML | Refills: 0
Start: 2019-07-16 | End: 2019-07-17

## 2019-07-16 RX ORDER — PANTOPRAZOLE SODIUM 40 MG/10ML
40 INJECTION, POWDER, LYOPHILIZED, FOR SOLUTION INTRAVENOUS DAILY
DISCHARGE
Start: 2019-07-17 | End: 2019-09-28

## 2019-07-16 RX ORDER — NOREPINEPHRINE BITARTRATE 1 MG/ML
INJECTION, SOLUTION INTRAVENOUS
Qty: 1 ML | DISCHARGE
Start: 2019-07-16 | End: 2019-09-28

## 2019-07-16 RX ORDER — ASPIRIN 81 MG/1
324 TABLET, CHEWABLE ORAL DAILY
Qty: 30 TABLET | Refills: 3
Start: 2019-07-17

## 2019-07-16 RX ORDER — HALOPERIDOL 5 MG/ML
1 INJECTION INTRAMUSCULAR EVERY 4 HOURS PRN
Qty: 2 ML | Refills: 0 | DISCHARGE
Start: 2019-07-16 | End: 2019-09-28

## 2019-07-16 RX ADMIN — Medication 15 ML: at 20:28

## 2019-07-16 RX ADMIN — DEXMEDETOMIDINE HYDROCHLORIDE 0.6 MCG/KG/HR: 100 INJECTION, SOLUTION INTRAVENOUS at 07:25

## 2019-07-16 RX ADMIN — NOREPINEPHRINE BITARTRATE 6 MCG/MIN: 1 INJECTION INTRAVENOUS at 05:06

## 2019-07-16 RX ADMIN — Medication 10 ML: at 09:46

## 2019-07-16 RX ADMIN — Medication 10 ML: at 20:21

## 2019-07-16 RX ADMIN — AMIODARONE HYDROCHLORIDE 200 MG: 200 TABLET ORAL at 09:46

## 2019-07-16 RX ADMIN — DEXMEDETOMIDINE HYDROCHLORIDE 0.9 MCG/KG/HR: 100 INJECTION, SOLUTION INTRAVENOUS at 00:36

## 2019-07-16 RX ADMIN — AMIODARONE HYDROCHLORIDE 200 MG: 200 TABLET ORAL at 20:21

## 2019-07-16 RX ADMIN — PANTOPRAZOLE SODIUM 40 MG: 40 INJECTION, POWDER, FOR SOLUTION INTRAVENOUS at 09:46

## 2019-07-16 RX ADMIN — ATORVASTATIN CALCIUM 40 MG: 40 TABLET, FILM COATED ORAL at 09:46

## 2019-07-16 RX ADMIN — Medication 15 ML: at 09:47

## 2019-07-16 RX ADMIN — ONDANSETRON 4 MG: 2 INJECTION INTRAMUSCULAR; INTRAVENOUS at 18:09

## 2019-07-16 NOTE — PROGRESS NOTES
[facility]   Progress Note  Cardiology    CC: cardiac arrest    HPI: denies pain/sob. Wants transfer to [facility]    Medications/Labs all Reviewed    Lab Results   Component Value Date    WBC 15.8 (H) 07/11/2019    HGB 13.8 07/11/2019    HCT 39.9 (L) 07/11/2019    MCV 92.3 07/11/2019     07/11/2019     Lab Results   Component Value Date    CREATININE 1.1 07/16/2019    BUN 52 (H) 07/16/2019     07/16/2019    K 3.8 07/16/2019    K 3.8 07/16/2019     07/16/2019    CO2 24 07/16/2019     Lab Results   Component Value Date    INR 1.18 (H) 07/10/2019    PROTIME 13.5 (H) 07/10/2019        Physical Examination:    BP (!) 116/52   Pulse (!) 41   Temp 98.7 °F (37.1 °C) (Core)   Resp 18   Ht 5' 10\" (1.778 m)   Wt 161 lb 6 oz (73.2 kg)   SpO2 96%   BMI 23.16 kg/m²        Respiratory:  · Resp Assessment: not labored   · Resp Auscultation: coarse bs bilaterally bilaterally   Cardiovascular:  · Auscultation: regular rate and rhythm, normal S1S2, 2/6 murmur, rub or gallop  · Palpation:  Nl PMI  · JVP:  normal  · Extremities: No Edema  Abdomen:  · Soft, decreased bowel sounds  Extremities:  ·  No Cyanosis or Clubbing  Neurological/Psychiatric:  · Responds to commands, answers questions. . Moves all 4 extremities  Skin:   · warm and dry      Assessment:    Cardiac arrest  - wintnessed. Bystander CPR. Defib on site. V tach - stable on amio. Severe CAD involving Cx and RCA. Moderate LAD disease  Severe aortic stenosis  CMP - EF 25%  Pericardial effusion - small  Cardiogenic shock - resolved. Off pressors  LBBB  Bradycardia - temp pacer in place. stable  Dilated aortic root. Recent CT ascending aorta 4.5 cm  Hypotension - resolved  Hypokalemia   H/O HTN  HLD  Smoker  Neuro status near baseline per family. Appears to understand his medical condition and options for further treatment. Plan  Add ACE when BP stable  Amiodarone  ASA and statin  Pt wants transfer to [facility].  Refuses any further cardiac procedures here. Risk of geno-transfer complication is low. Dicussed risk w/ patient and family. They understand and request proceed with transfer. Cardiology/CV surgery eval at [facility] for CABG/AVR vs PCI/TAVR. Add DAPT if no surgery.      Rafal Smith MD, 7/16/2019 1:30 PM

## 2019-07-16 NOTE — PLAN OF CARE
RN  Outcome: Ongoing  7/15/2019 1807 by Ana Monson RN  Outcome: Ongoing     Problem: Fluid Volume - Imbalance:  Goal: Absence of imbalanced fluid volume signs and symptoms  Description  Absence of imbalanced fluid volume signs and symptoms  Outcome: Ongoing     Problem: Gas Exchange - Impaired:  Goal: Levels of oxygenation will improve  Description  Levels of oxygenation will improve  7/15/2019 2330 by Ashley Mckeon RN  Outcome: Ongoing  7/15/2019 1807 by Ana Monson RN  Outcome: Ongoing     Problem: Pain:  Goal: Pain level will decrease  Description  Pain level will decrease  Outcome: Ongoing  Goal: Recognizes and communicates pain  Description  Recognizes and communicates pain  Outcome: Ongoing  Goal: Control of acute pain  Description  Control of acute pain  Outcome: Ongoing     Problem: Skin Integrity - Impaired:  Goal: Will show no infection signs and symptoms  Description  Will show no infection signs and symptoms  Outcome: Ongoing  Goal: Absence of new skin breakdown  Description  Absence of new skin breakdown  Outcome: Ongoing     Problem: Tissue Perfusion, Altered:  Goal: Circulatory function within specified parameters  Description  Circulatory function within specified parameters  Outcome: Ongoing

## 2019-07-16 NOTE — PROGRESS NOTES
CVTS Thoracic Progress Note:          CC: Cardiac arrest  MV CAD    Subj: Complaints centered around restraints, noise and thirst.     Obj:    Blood pressure (!) 116/52, pulse (!) 41, temperature 98.7 °F (37.1 °C), temperature source Core, resp. rate 21, height 5' 10\" (1.778 m), weight 161 lb 6 oz (73.2 kg), SpO2 93 %. Lungs generally decreased but with crackles on right. 50% vapotherm   Cor ventricular rhythm at 41. Abdomen soft, non-tender  GTTs:  Levophed 5 mcg/min, Precedex 0.6 mcg/kg/hr      Diagnostics:                                                            Recent Labs     07/14/19  0440 07/15/19  0517 07/15/19  2100 07/16/19  0513    146*  --  141   K 3.4*  3.4* 2.0*  2.0* 3.9 3.8  3.8    119*  --  104   CO2 27 19*  --  24   BUN 26* 23*  --  52*   CREATININE 0.8 <0.5*  --  1.1   GLUCOSE 95 69*  --  82          Recent Labs     07/14/19  0440 07/15/19  0517 07/16/19  0513   MG 1.80 1.30* 2.20      Preop Testing:  CXR: 7/15 evolving right sided pleural effusion    EEG 7/12/19  EEG is abnormal. The generalized diffuse slowing is suggestive of moderate diffuse encephalopathy. There is no evidence of epileptiform discharges, focal, or lateralizing abnormalities. Head CT 7/15/19 No acute pathology. Echo 7/10/19: Summary   Technically difficult examination due to patient on ventilator.  Definity®   used for myocardial border enhancement.   parasternal views could only be obtained from the right sternal border.   Left ventricular systolic function is severely reduced with a visually   estimated ejection fraction of 25-30%.   EF calculated by Escoto's method at 19%.   Abnormal (paradoxical) septal motion consistent with RV pacemaker.   The right ventricle is not well visualized but appears grossly normal in   size with reduced function.   The left atrium appears mildly enlarged.   Mild aortic and tricuspid regurgitation.   Severe AS   Mild tricuspid regurgitation.   Systolic pulmonary

## 2019-07-16 NOTE — PROGRESS NOTES
of the basilar      Mild paranasal sinus disease         XR CHEST PORTABLE   Final Result   Progressive right-sided pleuroparenchymal disease. XR CHEST PORTABLE   Final Result   1. Worsening right airspace disease and mild pulmonary vascular congestion. XR CHEST PORTABLE   Final Result   New right parahilar and lower lobe airspace disease with possible small   pleural effusion. While this could represent atelectasis or pneumonia,   aspiration is also considered. No pneumothorax, right IJ pacemaker lead   towards the right ventricular apex in satisfactory position. XR CHEST PORTABLE   Final Result   No pneumothorax status post left subclavian line placement with tip in the   upper superior vena cava         CT HEAD WO CONTRAST   Final Result   No acute intracranial abnormality detected. 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 Further evaluation of that with CT   angiography is recommended. XR CHEST PORTABLE   Final Result   1. Endotracheal tube 5 cm above the dasia   2. No active cardiopulmonary disease         XR ABDOMEN (KUB) (SINGLE AP VIEW)   Final Result   NG tube terminates in the stomach.          VL DUP CAROTID BILATERAL    (Results Pending)   VL PRE OP VEIN MAPPING    (Results Pending)           Assessment/Plan:    Active Hospital Problems    Diagnosis    Anoxic brain damage ([address] [G93.1]    New onset seizure ([address] [R56.9]    Elevated troponin [R74.8]    Lactic acid acidosis [E87.2]    Leukocytosis [D72.829]    Ischemic cardiomyopathy [I25.5]    Elevated LFTs [R94.5]    Acute respiratory failure with hypoxia (HCC) [J96.01]    Severe aortic stenosis [I35.0]    Cardiopulmonary arrest with successful resuscitation ([address] [I46.9]    Cardiac arrest ([address] [I46.9]    Cardiogenic shock ([address] [R57.0]    Ventricular tachycardia ([address] [I47.2]       PLAN:    Cardiac arrest  Out of hospital with bystander CPR with defibrillation onsite  Remains bradycardic  Likely

## 2019-07-16 NOTE — PROGRESS NOTES
Speech Language Pathology  Facility/Department: [facility] C2 CARD TELEMETRY  Dysphagia Daily Treatment Note      Recommendations: Recommend pt continue to be NPO with exception of small volume of ice chips (3-4 at a time) after oral care, crushable meds crushed in puree (*as tolerated). *Pt may benefit from short-term alternative means of nutrition. *If pt has worsening respiratory status or overt s/s of aspiration with minimal PO, recommend downgrade to strict NPO    NAME: Durga Beebe  : 1945  MRN: [id number]    Patient Diagnosis(es):   Patient Active Problem List    Diagnosis Date Noted    Anoxic brain damage ([facility] [address] [facility]     New onset seizure ([address]     Elevated troponin 07/10/2019    Lactic acid acidosis 07/10/2019    Leukocytosis 07/10/2019    Ischemic cardiomyopathy 07/10/2019    Elevated LFTs 07/10/2019    Acute respiratory failure with hypoxia ([address] 07/10/2019    Severe aortic stenosis 07/10/2019    Cardiopulmonary arrest with successful resuscitation ([address] 07/10/2019    Cardiac arrest ([address]     Cardiogenic shock ([address]     Ventricular tachycardia ([address]     Right elbow pain 2019    Olecranon bursitis of right elbow 2019       Pain: No c/o pain during tx session    Current Diet: NPO    Diet Tolerance:  Pt is currently NPO exception of ice chips and meds in puree as tolerated    P.O. Trials: Thin   x x3 ice chips  x5 cup sips (assisted by SLP)     [place]       Honey       Puree   x x2 1/2 tsp puree  x4 bites with crushable meds    Solid         Impressions:  Pt seen upright in bed, alert and agreeable to therapy, somewhat confused. RN OK'd SLP entry and therapy, several family members at bedside. Pt in bilateral wrist restraints. Pt eager to eat / drink. Pt with worsening respiratory demands, now on Vapotherm. Pt with strong, congested cough that is unproductive. Suction set up at bedside. Per RN and family member, pt with \"gurgly\" breath sounds after ice chips overnight.

## 2019-07-16 NOTE — PROGRESS NOTES
Pulmonary & Critical Care Medicine ICU Progress Note      Events of Last 24 hours: The patient remains extubated, is less confused, agitated. He remains on a temporary pacer, rate turned down by cardiothoracic surgery. He is hemodynamically stable with his heart rate in the 30s. He remains afebrile and hemodynamically stable, although on low-dose norepinephrine. Oxygen saturation improved, does not appear to be in respiratory distress. He did pull out his arterial line. He remains on low-dose levophed. He was taken off Precedex. Recent Labs     07/15/19  0517 07/16/19  0514   PHART 7.483* 7.412   QLD8ILC 36.4 36.6   PO2ART 74.6* 90.0       IV:     norepinephrine (LEVOPHED) infusion 2 mcg/min (07/16/19 1447)       Vitals:  /71   Pulse (!) 47   Temp 98.6 °F (37 °C) (Core)   Resp 20   Ht 5' 10\" (1.778 m)   Wt 161 lb 6 oz (73.2 kg)   SpO2 94%   BMI 23.16 kg/m²  CVP (Mean): 2 mmHg      Intake/Output Summary (Last 24 hours) at 7/16/2019 1456  Last data filed at 7/16/2019 1400  Gross per 24 hour   Intake 1071 ml   Output 980 ml   Net 91 ml       EXAM:  General: No distress. Averagely built, conversant, slightly pale and less ill-appearing. Eyes: PERRL. No sclera icterus. No conjunctival injection. ENT: No discharge. Pharynx clear. Class II airway  Neck: Trachea midline. Normal thyroid. RIJ pacemaker  Resp: No accessory muscle use. No crackles. No wheezing. No rhonchi. Reduced air entry right hemithorax, dull to percussion right lower chest and sitting up position. Left subclavian CVC. CV: Paced rhythm. No mumur or rub. No edema. GI: Non-tender. Non-distended. Skin: Warm and dry. No nodule or rash on exposed extremities. Multiple ecchymoses  Lymph: Deferred. M/S: No cyanosis. No joint deformity. No clubbing. Neuro: Awake. Follows commands. Normal pain response. No obvious focal deficit.   Psych: Oriented intermittently, repeats questions frequently, though improved compared to

## 2019-07-16 NOTE — CARE COORDINATION
Writer approached by nursing stating that patient is now requesting to be transferred to [facility] for pacemaker insertion and remaining care. Concerns are that patient is requiring use of temporary pacemaker. Writer placed call to [employer] and they ARE able to manage the temporary pacemaker however they would have to travel with our machine and it would be returned after transport.   Keagan Maurer RN discussing this with unit manager and MD.  Phong Goldman RN

## 2019-07-17 ENCOUNTER — TELEPHONE (OUTPATIENT)
Dept: CARDIOLOGY CLINIC | Age: 74
End: 2019-07-17

## 2019-08-09 PROBLEM — R77.8 ELEVATED TROPONIN: Status: RESOLVED | Noted: 2019-07-10 | Resolved: 2019-08-09

## 2019-08-09 PROBLEM — R79.89 ELEVATED TROPONIN: Status: RESOLVED | Noted: 2019-07-10 | Resolved: 2019-08-09

## 2019-09-28 ENCOUNTER — APPOINTMENT (OUTPATIENT)
Dept: CT IMAGING | Age: 74
End: 2019-09-28
Payer: MEDICARE

## 2019-09-28 ENCOUNTER — APPOINTMENT (OUTPATIENT)
Dept: GENERAL RADIOLOGY | Age: 74
End: 2019-09-28
Payer: MEDICARE

## 2019-09-28 ENCOUNTER — HOSPITAL ENCOUNTER (EMERGENCY)
Age: 74
Discharge: HOME | End: 2019-09-28
Attending: EMERGENCY MEDICINE
Payer: MEDICARE

## 2019-09-28 VITALS
SYSTOLIC BLOOD PRESSURE: 171 MMHG | WEIGHT: 165 LBS | TEMPERATURE: 98.2 F | BODY MASS INDEX: 25.01 KG/M2 | HEIGHT: 68 IN | RESPIRATION RATE: 16 BRPM | DIASTOLIC BLOOD PRESSURE: 104 MMHG | OXYGEN SATURATION: 96 % | HEART RATE: 84 BPM

## 2019-09-28 DIAGNOSIS — S22.31XA CLOSED FRACTURE OF ONE RIB OF RIGHT SIDE, INITIAL ENCOUNTER: Primary | ICD-10-CM

## 2019-09-28 PROCEDURE — 71250 CT THORAX DX C-: CPT

## 2019-09-28 PROCEDURE — 99284 EMERGENCY DEPT VISIT MOD MDM: CPT

## 2019-09-28 PROCEDURE — 6370000000 HC RX 637 (ALT 250 FOR IP): Performed by: EMERGENCY MEDICINE

## 2019-09-28 PROCEDURE — 71101 X-RAY EXAM UNILAT RIBS/CHEST: CPT

## 2019-09-28 RX ORDER — METOPROLOL SUCCINATE 25 MG/1
25 TABLET, EXTENDED RELEASE ORAL DAILY
COMMUNITY

## 2019-09-28 RX ORDER — METHOCARBAMOL 750 MG/1
750 TABLET, FILM COATED ORAL ONCE
Status: COMPLETED | OUTPATIENT
Start: 2019-09-28 | End: 2019-09-28

## 2019-09-28 RX ORDER — LIDOCAINE 4 G/G
1 PATCH TOPICAL DAILY
Status: DISCONTINUED | OUTPATIENT
Start: 2019-09-28 | End: 2019-09-28 | Stop reason: HOSPADM

## 2019-09-28 RX ORDER — ACETAMINOPHEN 500 MG
1000 TABLET ORAL ONCE
Status: COMPLETED | OUTPATIENT
Start: 2019-09-28 | End: 2019-09-28

## 2019-09-28 RX ORDER — AMLODIPINE BESYLATE 10 MG/1
10 TABLET ORAL DAILY
COMMUNITY

## 2019-09-28 RX ORDER — METHOCARBAMOL 750 MG/1
750 TABLET, FILM COATED ORAL 3 TIMES DAILY
Qty: 30 TABLET | Refills: 0 | Status: SHIPPED | OUTPATIENT
Start: 2019-09-28 | End: 2019-10-08

## 2019-09-28 RX ADMIN — METHOCARBAMOL TABLETS 750 MG: 750 TABLET, COATED ORAL at 17:03

## 2019-09-28 RX ADMIN — ACETAMINOPHEN 1000 MG: 500 TABLET ORAL at 17:03

## 2019-09-28 ASSESSMENT — ENCOUNTER SYMPTOMS
ABDOMINAL PAIN: 0
COUGH: 0
PHOTOPHOBIA: 0
BACK PAIN: 0
NAUSEA: 0
RHINORRHEA: 0
VOMITING: 0
DIARRHEA: 0
SHORTNESS OF BREATH: 0
WHEEZING: 0

## 2019-09-28 ASSESSMENT — PAIN SCALES - GENERAL
PAINLEVEL_OUTOF10: 8
PAINLEVEL_OUTOF10: 8

## 2019-09-28 ASSESSMENT — PAIN DESCRIPTION - PROGRESSION: CLINICAL_PROGRESSION: NOT CHANGED

## 2019-09-28 ASSESSMENT — PAIN DESCRIPTION - FREQUENCY: FREQUENCY: CONTINUOUS

## 2019-09-28 ASSESSMENT — PAIN DESCRIPTION - ONSET: ONSET: ON-GOING

## 2019-09-28 ASSESSMENT — PAIN DESCRIPTION - DESCRIPTORS: DESCRIPTORS: ACHING

## 2019-09-28 ASSESSMENT — PAIN DESCRIPTION - LOCATION: LOCATION: RIB CAGE

## 2019-09-28 ASSESSMENT — PAIN DESCRIPTION - ORIENTATION: ORIENTATION: RIGHT

## 2019-09-28 ASSESSMENT — PAIN DESCRIPTION - PAIN TYPE: TYPE: ACUTE PAIN

## 2024-09-08 ENCOUNTER — APPOINTMENT (OUTPATIENT)
Dept: CT IMAGING | Age: 79
End: 2024-09-08
Payer: MEDICARE

## 2024-09-08 ENCOUNTER — APPOINTMENT (OUTPATIENT)
Dept: GENERAL RADIOLOGY | Age: 79
End: 2024-09-08
Payer: MEDICARE

## 2024-09-08 ENCOUNTER — HOSPITAL ENCOUNTER (EMERGENCY)
Age: 79
Discharge: HOME | End: 2024-09-09
Attending: EMERGENCY MEDICINE
Payer: MEDICARE

## 2024-09-08 DIAGNOSIS — S80.212A ABRASION OF LEFT KNEE, INITIAL ENCOUNTER: ICD-10-CM

## 2024-09-08 DIAGNOSIS — S02.2XXB OPEN DISPLACED FRACTURE OF NASAL BONE, INITIAL ENCOUNTER: Primary | ICD-10-CM
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
## 2024-09-08 DIAGNOSIS — Z23 NEED FOR PROPHYLACTIC VACCINATION AGAINST DIPHTHERIA AND TETANUS: ICD-10-CM

## 2024-09-08 DIAGNOSIS — S01.511A LIP LACERATION, INITIAL ENCOUNTER: ICD-10-CM

## 2024-09-08 DIAGNOSIS — S01.21XA LACERATION OF NOSE, INITIAL ENCOUNTER: ICD-10-CM

## 2024-09-08 PROCEDURE — 73560 X-RAY EXAM OF KNEE 1 OR 2: CPT

## 2024-09-08 PROCEDURE — 99284 EMERGENCY DEPT VISIT MOD MDM: CPT

## 2024-09-08 PROCEDURE — 70486 CT MAXILLOFACIAL W/O DYE: CPT

## 2024-09-08 PROCEDURE — 12011 RPR F/E/E/N/L/M 2.5 CM/<: CPT

## 2024-09-08 PROCEDURE — 70450 CT HEAD/BRAIN W/O DYE: CPT

## 2024-09-08 PROCEDURE — 72125 CT NECK SPINE W/O DYE: CPT

## 2024-09-08 ASSESSMENT — PAIN SCALES - GENERAL: PAINLEVEL_OUTOF10: 3

## 2024-09-08 ASSESSMENT — PAIN DESCRIPTION - DESCRIPTORS: DESCRIPTORS: ACHING

## 2024-09-08 ASSESSMENT — PAIN DESCRIPTION - PAIN TYPE: TYPE: ACUTE PAIN

## 2024-09-08 ASSESSMENT — PAIN - FUNCTIONAL ASSESSMENT: PAIN_FUNCTIONAL_ASSESSMENT: 0-10

## 2024-09-09 VITALS
HEART RATE: 77 BPM | RESPIRATION RATE: 15 BRPM | OXYGEN SATURATION: 96 % | SYSTOLIC BLOOD PRESSURE: 142 MMHG | TEMPERATURE: 97.9 F | DIASTOLIC BLOOD PRESSURE: 81 MMHG

## 2024-09-09 PROCEDURE — 6360000002 HC RX W HCPCS: Performed by: PHYSICIAN ASSISTANT

## 2024-09-09 PROCEDURE — 90715 TDAP VACCINE 7 YRS/> IM: CPT | Performed by: PHYSICIAN ASSISTANT

## 2024-09-09 PROCEDURE — 6370000000 HC RX 637 (ALT 250 FOR IP): Performed by: PHYSICIAN ASSISTANT

## 2024-09-09 PROCEDURE — 6370000000 HC RX 637 (ALT 250 FOR IP): Performed by: EMERGENCY MEDICINE

## 2024-09-09 PROCEDURE — 90471 IMMUNIZATION ADMIN: CPT | Performed by: PHYSICIAN ASSISTANT

## 2024-09-09 RX ORDER — CEPHALEXIN 500 MG/1
500 CAPSULE ORAL 3 TIMES DAILY
Qty: 21 CAPSULE | Refills: 0 | Status: SHIPPED | OUTPATIENT
Start: 2024-09-09 | End: 2024-09-16

## 2024-09-09 RX ORDER — ACETAMINOPHEN 500 MG
1000 TABLET ORAL ONCE
Status: COMPLETED | OUTPATIENT
Start: 2024-09-09 | End: 2024-09-09

## 2024-09-09 RX ADMIN — ACETAMINOPHEN 1000 MG: 500 TABLET ORAL at 02:22

## 2024-09-09 RX ADMIN — Medication: at 02:22

## 2024-09-09 RX ADMIN — TETANUS TOXOID, REDUCED DIPHTHERIA TOXOID AND ACELLULAR PERTUSSIS VACCINE, ADSORBED 0.5 ML: 5; 2.5; 8; 8; 2.5 SUSPENSION INTRAMUSCULAR at 00:46

## 2024-09-09 RX ADMIN — CEPHALEXIN 500 MG: 250 CAPSULE ORAL at 00:46

## 2024-09-09 ASSESSMENT — PAIN DESCRIPTION - DESCRIPTORS: DESCRIPTORS: ACHING

## 2024-09-09 ASSESSMENT — LIFESTYLE VARIABLES
HOW MANY STANDARD DRINKS CONTAINING ALCOHOL DO YOU HAVE ON A TYPICAL DAY: PATIENT DOES NOT DRINK
HOW OFTEN DO YOU HAVE A DRINK CONTAINING ALCOHOL: NEVER

## 2024-09-09 ASSESSMENT — PAIN SCALES - GENERAL: PAINLEVEL_OUTOF10: 2

## 2024-09-09 ASSESSMENT — PAIN DESCRIPTION - LOCATION: LOCATION: FACE

## 2024-09-11 ENCOUNTER — OFFICE VISIT (OUTPATIENT)
Dept: ENT CLINIC | Age: 79
End: 2024-09-11
Payer: MEDICARE

## 2024-09-11 VITALS
SYSTOLIC BLOOD PRESSURE: 116 MMHG | HEIGHT: 68 IN | BODY MASS INDEX: 21.22 KG/M2 | DIASTOLIC BLOOD PRESSURE: 68 MMHG | HEART RATE: 86 BPM | WEIGHT: 140 LBS

## 2024-09-11 DIAGNOSIS — S02.2XXA CLOSED FRACTURE OF NASAL BONE, INITIAL ENCOUNTER: Primary | ICD-10-CM

## 2024-09-11 PROCEDURE — 99203 OFFICE O/P NEW LOW 30 MIN: CPT | Performed by: OTOLARYNGOLOGY

## 2024-09-11 PROCEDURE — 4004F PT TOBACCO SCREEN RCVD TLK: CPT | Performed by: OTOLARYNGOLOGY

## 2024-09-11 PROCEDURE — 1123F ACP DISCUSS/DSCN MKR DOCD: CPT | Performed by: OTOLARYNGOLOGY

## 2024-09-11 PROCEDURE — G8427 DOCREV CUR MEDS BY ELIG CLIN: HCPCS | Performed by: OTOLARYNGOLOGY

## 2024-09-11 PROCEDURE — G8420 CALC BMI NORM PARAMETERS: HCPCS | Performed by: OTOLARYNGOLOGY

## 2024-09-11 ASSESSMENT — ENCOUNTER SYMPTOMS
APNEA: 0
TROUBLE SWALLOWING: 0
FACIAL SWELLING: 0
VOICE CHANGE: 0
EYE ITCHING: 0
SORE THROAT: 0
COUGH: 0
SHORTNESS OF BREATH: 0
SINUS PRESSURE: 0